# Patient Record
Sex: MALE | Race: BLACK OR AFRICAN AMERICAN | NOT HISPANIC OR LATINO | Employment: FULL TIME | URBAN - METROPOLITAN AREA
[De-identification: names, ages, dates, MRNs, and addresses within clinical notes are randomized per-mention and may not be internally consistent; named-entity substitution may affect disease eponyms.]

---

## 2019-03-12 ENCOUNTER — HOSPITAL ENCOUNTER (OUTPATIENT)
Dept: OTHER | Facility: HOSPITAL | Age: 69
Setting detail: SPECIMEN
Discharge: HOME | End: 2019-03-12
Attending: SURGERY | Admitting: SURGERY

## 2020-08-09 ENCOUNTER — APPOINTMENT (OUTPATIENT)
Dept: GENERAL RADIOLOGY | Facility: HOSPITAL | Age: 70
End: 2020-08-09

## 2020-08-09 ENCOUNTER — HOSPITAL ENCOUNTER (INPATIENT)
Facility: HOSPITAL | Age: 70
LOS: 3 days | Discharge: HOME | End: 2020-08-12
Attending: EMERGENCY MEDICINE | Admitting: INTERNAL MEDICINE

## 2020-08-09 DIAGNOSIS — J44.1 COPD WITH ACUTE EXACERBATION (HCC): ICD-10-CM

## 2020-08-09 DIAGNOSIS — J96.01 ACUTE RESPIRATORY FAILURE WITH HYPOXIA (HCC): Primary | ICD-10-CM

## 2020-08-09 DIAGNOSIS — J18.9 COMMUNITY ACQUIRED PNEUMONIA, UNSPECIFIED LATERALITY: ICD-10-CM

## 2020-08-09 DIAGNOSIS — Z20.822 SUSPECTED COVID-19 VIRUS INFECTION: ICD-10-CM

## 2020-08-09 LAB
ALBUMIN SERPL-MCNC: 3.5 G/DL (ref 3.5–5.2)
ALBUMIN/GLOB SERPL: 1 G/DL
ALP SERPL-CCNC: 60 U/L (ref 39–117)
ALT SERPL W P-5'-P-CCNC: 27 U/L (ref 1–41)
ANION GAP SERPL CALCULATED.3IONS-SCNC: 12.4 MMOL/L (ref 5–15)
AST SERPL-CCNC: 27 U/L (ref 1–40)
BASOPHILS # BLD AUTO: 0.02 10*3/MM3 (ref 0–0.2)
BASOPHILS NFR BLD AUTO: 0.3 % (ref 0–1.5)
BILIRUB SERPL-MCNC: 0.3 MG/DL (ref 0–1.2)
BUN SERPL-MCNC: 8 MG/DL (ref 8–23)
BUN/CREAT SERPL: 8.4 (ref 7–25)
CALCIUM SPEC-SCNC: 9.3 MG/DL (ref 8.6–10.5)
CHLORIDE SERPL-SCNC: 98 MMOL/L (ref 98–107)
CO2 SERPL-SCNC: 23.6 MMOL/L (ref 22–29)
CREAT SERPL-MCNC: 0.95 MG/DL (ref 0.76–1.27)
D-LACTATE SERPL-SCNC: 1.2 MMOL/L (ref 0.5–2)
DEPRECATED RDW RBC AUTO: 43.8 FL (ref 37–54)
EOSINOPHIL # BLD AUTO: 0 10*3/MM3 (ref 0–0.4)
EOSINOPHIL NFR BLD AUTO: 0 % (ref 0.3–6.2)
ERYTHROCYTE [DISTWIDTH] IN BLOOD BY AUTOMATED COUNT: 13.1 % (ref 12.3–15.4)
GFR SERPL CREATININE-BSD FRML MDRD: 95 ML/MIN/1.73
GLOBULIN UR ELPH-MCNC: 3.6 GM/DL
GLUCOSE SERPL-MCNC: 104 MG/DL (ref 65–99)
HCT VFR BLD AUTO: 42.5 % (ref 37.5–51)
HGB BLD-MCNC: 13.6 G/DL (ref 13–17.7)
IMM GRANULOCYTES # BLD AUTO: 0.04 10*3/MM3 (ref 0–0.05)
IMM GRANULOCYTES NFR BLD AUTO: 0.6 % (ref 0–0.5)
LYMPHOCYTES # BLD AUTO: 1.03 10*3/MM3 (ref 0.7–3.1)
LYMPHOCYTES NFR BLD AUTO: 14.2 % (ref 19.6–45.3)
MCH RBC QN AUTO: 29.3 PG (ref 26.6–33)
MCHC RBC AUTO-ENTMCNC: 32 G/DL (ref 31.5–35.7)
MCV RBC AUTO: 91.6 FL (ref 79–97)
MONOCYTES # BLD AUTO: 1.08 10*3/MM3 (ref 0.1–0.9)
MONOCYTES NFR BLD AUTO: 14.9 % (ref 5–12)
NEUTROPHILS NFR BLD AUTO: 5.1 10*3/MM3 (ref 1.7–7)
NEUTROPHILS NFR BLD AUTO: 70 % (ref 42.7–76)
NRBC BLD AUTO-RTO: 0 /100 WBC (ref 0–0.2)
NT-PROBNP SERPL-MCNC: 538 PG/ML (ref 0–900)
PLATELET # BLD AUTO: 251 10*3/MM3 (ref 140–450)
PMV BLD AUTO: 10.2 FL (ref 6–12)
POTASSIUM SERPL-SCNC: 3.8 MMOL/L (ref 3.5–5.2)
PROT SERPL-MCNC: 7.1 G/DL (ref 6–8.5)
RBC # BLD AUTO: 4.64 10*6/MM3 (ref 4.14–5.8)
SODIUM SERPL-SCNC: 134 MMOL/L (ref 136–145)
TROPONIN T SERPL-MCNC: <0.01 NG/ML (ref 0–0.03)
WBC # BLD AUTO: 7.27 10*3/MM3 (ref 3.4–10.8)

## 2020-08-09 PROCEDURE — 93010 ELECTROCARDIOGRAM REPORT: CPT | Performed by: INTERNAL MEDICINE

## 2020-08-09 PROCEDURE — 80053 COMPREHEN METABOLIC PANEL: CPT | Performed by: PHYSICIAN ASSISTANT

## 2020-08-09 PROCEDURE — 36415 COLL VENOUS BLD VENIPUNCTURE: CPT

## 2020-08-09 PROCEDURE — 93005 ELECTROCARDIOGRAM TRACING: CPT

## 2020-08-09 PROCEDURE — 83880 ASSAY OF NATRIURETIC PEPTIDE: CPT | Performed by: PHYSICIAN ASSISTANT

## 2020-08-09 PROCEDURE — 87040 BLOOD CULTURE FOR BACTERIA: CPT | Performed by: PHYSICIAN ASSISTANT

## 2020-08-09 PROCEDURE — 84145 PROCALCITONIN (PCT): CPT | Performed by: HOSPITALIST

## 2020-08-09 PROCEDURE — 99284 EMERGENCY DEPT VISIT MOD MDM: CPT

## 2020-08-09 PROCEDURE — 83605 ASSAY OF LACTIC ACID: CPT | Performed by: PHYSICIAN ASSISTANT

## 2020-08-09 PROCEDURE — 71045 X-RAY EXAM CHEST 1 VIEW: CPT

## 2020-08-09 PROCEDURE — 84484 ASSAY OF TROPONIN QUANT: CPT | Performed by: PHYSICIAN ASSISTANT

## 2020-08-09 PROCEDURE — 25010000002 CEFTRIAXONE PER 250 MG: Performed by: PHYSICIAN ASSISTANT

## 2020-08-09 PROCEDURE — 93005 ELECTROCARDIOGRAM TRACING: CPT | Performed by: EMERGENCY MEDICINE

## 2020-08-09 PROCEDURE — 0202U NFCT DS 22 TRGT SARS-COV-2: CPT | Performed by: PHYSICIAN ASSISTANT

## 2020-08-09 PROCEDURE — 85025 COMPLETE CBC W/AUTO DIFF WBC: CPT | Performed by: PHYSICIAN ASSISTANT

## 2020-08-09 RX ORDER — SODIUM CHLORIDE 0.9 % (FLUSH) 0.9 %
10 SYRINGE (ML) INJECTION AS NEEDED
Status: DISCONTINUED | OUTPATIENT
Start: 2020-08-09 | End: 2020-08-12 | Stop reason: HOSPADM

## 2020-08-09 RX ORDER — NITROGLYCERIN 0.4 MG/1
0.4 TABLET SUBLINGUAL
Status: DISCONTINUED | OUTPATIENT
Start: 2020-08-09 | End: 2020-08-12 | Stop reason: HOSPADM

## 2020-08-09 RX ORDER — SODIUM CHLORIDE, SODIUM LACTATE, POTASSIUM CHLORIDE, CALCIUM CHLORIDE 600; 310; 30; 20 MG/100ML; MG/100ML; MG/100ML; MG/100ML
50 INJECTION, SOLUTION INTRAVENOUS CONTINUOUS
Status: DISCONTINUED | OUTPATIENT
Start: 2020-08-09 | End: 2020-08-12 | Stop reason: HOSPADM

## 2020-08-09 RX ORDER — CEFTRIAXONE SODIUM 1 G/50ML
1 INJECTION, SOLUTION INTRAVENOUS EVERY 24 HOURS
Status: DISCONTINUED | OUTPATIENT
Start: 2020-08-10 | End: 2020-08-12 | Stop reason: HOSPADM

## 2020-08-09 RX ORDER — ONDANSETRON 4 MG/1
4 TABLET, FILM COATED ORAL EVERY 6 HOURS PRN
Status: DISCONTINUED | OUTPATIENT
Start: 2020-08-09 | End: 2020-08-12 | Stop reason: HOSPADM

## 2020-08-09 RX ORDER — ONDANSETRON 2 MG/ML
4 INJECTION INTRAMUSCULAR; INTRAVENOUS EVERY 6 HOURS PRN
Status: DISCONTINUED | OUTPATIENT
Start: 2020-08-09 | End: 2020-08-12 | Stop reason: HOSPADM

## 2020-08-09 RX ORDER — SODIUM CHLORIDE 0.9 % (FLUSH) 0.9 %
10 SYRINGE (ML) INJECTION EVERY 12 HOURS SCHEDULED
Status: DISCONTINUED | OUTPATIENT
Start: 2020-08-09 | End: 2020-08-12 | Stop reason: HOSPADM

## 2020-08-09 RX ORDER — ACETAMINOPHEN 160 MG/5ML
650 SOLUTION ORAL EVERY 4 HOURS PRN
Status: DISCONTINUED | OUTPATIENT
Start: 2020-08-09 | End: 2020-08-12 | Stop reason: HOSPADM

## 2020-08-09 RX ORDER — ACETAMINOPHEN 650 MG/1
650 SUPPOSITORY RECTAL EVERY 4 HOURS PRN
Status: DISCONTINUED | OUTPATIENT
Start: 2020-08-09 | End: 2020-08-12 | Stop reason: HOSPADM

## 2020-08-09 RX ORDER — BISACODYL 10 MG
10 SUPPOSITORY, RECTAL RECTAL DAILY PRN
Status: DISCONTINUED | OUTPATIENT
Start: 2020-08-09 | End: 2020-08-12 | Stop reason: HOSPADM

## 2020-08-09 RX ORDER — BISACODYL 5 MG/1
5 TABLET, DELAYED RELEASE ORAL DAILY PRN
Status: DISCONTINUED | OUTPATIENT
Start: 2020-08-09 | End: 2020-08-12 | Stop reason: HOSPADM

## 2020-08-09 RX ORDER — ACETAMINOPHEN 325 MG/1
650 TABLET ORAL EVERY 4 HOURS PRN
Status: DISCONTINUED | OUTPATIENT
Start: 2020-08-09 | End: 2020-08-12 | Stop reason: HOSPADM

## 2020-08-09 RX ORDER — CEFTRIAXONE SODIUM 1 G/50ML
1 INJECTION, SOLUTION INTRAVENOUS ONCE
Status: COMPLETED | OUTPATIENT
Start: 2020-08-09 | End: 2020-08-10

## 2020-08-09 RX ADMIN — CEFTRIAXONE SODIUM 1 G: 1 INJECTION, SOLUTION INTRAVENOUS at 23:27

## 2020-08-10 PROBLEM — J18.9 PNEUMONIA: Status: ACTIVE | Noted: 2020-08-10

## 2020-08-10 PROBLEM — J44.9 COPD (CHRONIC OBSTRUCTIVE PULMONARY DISEASE) (HCC): Status: ACTIVE | Noted: 2020-08-10

## 2020-08-10 PROBLEM — J01.90 ACUTE SINUSITIS: Status: ACTIVE | Noted: 2020-08-10

## 2020-08-10 PROBLEM — I10 HTN (HYPERTENSION): Status: ACTIVE | Noted: 2020-08-10

## 2020-08-10 LAB
ANION GAP SERPL CALCULATED.3IONS-SCNC: 8.2 MMOL/L (ref 5–15)
B PARAPERT DNA SPEC QL NAA+PROBE: NOT DETECTED
B PERT DNA SPEC QL NAA+PROBE: NOT DETECTED
BUN SERPL-MCNC: 8 MG/DL (ref 8–23)
BUN/CREAT SERPL: 8.8 (ref 7–25)
C PNEUM DNA NPH QL NAA+NON-PROBE: NOT DETECTED
CALCIUM SPEC-SCNC: 8.9 MG/DL (ref 8.6–10.5)
CHLORIDE SERPL-SCNC: 100 MMOL/L (ref 98–107)
CO2 SERPL-SCNC: 25.8 MMOL/L (ref 22–29)
CREAT SERPL-MCNC: 0.91 MG/DL (ref 0.76–1.27)
DEPRECATED RDW RBC AUTO: 39.8 FL (ref 37–54)
ERYTHROCYTE [DISTWIDTH] IN BLOOD BY AUTOMATED COUNT: 12.6 % (ref 12.3–15.4)
FLUAV H1 2009 PAND RNA NPH QL NAA+PROBE: NOT DETECTED
FLUAV H1 HA GENE NPH QL NAA+PROBE: NOT DETECTED
FLUAV H3 RNA NPH QL NAA+PROBE: NOT DETECTED
FLUAV SUBTYP SPEC NAA+PROBE: NOT DETECTED
FLUBV RNA ISLT QL NAA+PROBE: NOT DETECTED
GFR SERPL CREATININE-BSD FRML MDRD: 100 ML/MIN/1.73
GLUCOSE SERPL-MCNC: 138 MG/DL (ref 65–99)
HADV DNA SPEC NAA+PROBE: NOT DETECTED
HCOV 229E RNA SPEC QL NAA+PROBE: NOT DETECTED
HCOV HKU1 RNA SPEC QL NAA+PROBE: NOT DETECTED
HCOV NL63 RNA SPEC QL NAA+PROBE: NOT DETECTED
HCOV OC43 RNA SPEC QL NAA+PROBE: NOT DETECTED
HCT VFR BLD AUTO: 39.9 % (ref 37.5–51)
HGB BLD-MCNC: 13.3 G/DL (ref 13–17.7)
HMPV RNA NPH QL NAA+NON-PROBE: NOT DETECTED
HPIV1 RNA SPEC QL NAA+PROBE: NOT DETECTED
HPIV2 RNA SPEC QL NAA+PROBE: NOT DETECTED
HPIV3 RNA NPH QL NAA+PROBE: NOT DETECTED
HPIV4 P GENE NPH QL NAA+PROBE: NOT DETECTED
L PNEUMO1 AG UR QL IA: NEGATIVE
M PNEUMO IGG SER IA-ACNC: NOT DETECTED
MAGNESIUM SERPL-MCNC: 2.1 MG/DL (ref 1.6–2.4)
MCH RBC QN AUTO: 29 PG (ref 26.6–33)
MCHC RBC AUTO-ENTMCNC: 33.3 G/DL (ref 31.5–35.7)
MCV RBC AUTO: 87.1 FL (ref 79–97)
MRSA DNA SPEC QL NAA+PROBE: NORMAL
PHOSPHATE SERPL-MCNC: 2.5 MG/DL (ref 2.5–4.5)
PLATELET # BLD AUTO: 244 10*3/MM3 (ref 140–450)
PMV BLD AUTO: 10.2 FL (ref 6–12)
POTASSIUM SERPL-SCNC: 3.8 MMOL/L (ref 3.5–5.2)
PROCALCITONIN SERPL-MCNC: 0.09 NG/ML (ref 0–0.25)
RBC # BLD AUTO: 4.58 10*6/MM3 (ref 4.14–5.8)
RHINOVIRUS RNA SPEC NAA+PROBE: NOT DETECTED
RSV RNA NPH QL NAA+NON-PROBE: NOT DETECTED
S PNEUM AG SPEC QL LA: NEGATIVE
SARS-COV-2 RNA PNL SPEC NAA+PROBE: NOT DETECTED
SODIUM SERPL-SCNC: 134 MMOL/L (ref 136–145)
WBC # BLD AUTO: 6.46 10*3/MM3 (ref 3.4–10.8)

## 2020-08-10 PROCEDURE — 94640 AIRWAY INHALATION TREATMENT: CPT

## 2020-08-10 PROCEDURE — 25010000002 ENOXAPARIN PER 10 MG: Performed by: HOSPITALIST

## 2020-08-10 PROCEDURE — 83735 ASSAY OF MAGNESIUM: CPT | Performed by: HOSPITALIST

## 2020-08-10 PROCEDURE — 85027 COMPLETE CBC AUTOMATED: CPT | Performed by: HOSPITALIST

## 2020-08-10 PROCEDURE — 25010000002 CEFTRIAXONE PER 250 MG: Performed by: HOSPITALIST

## 2020-08-10 PROCEDURE — 94799 UNLISTED PULMONARY SVC/PX: CPT

## 2020-08-10 PROCEDURE — 87899 AGENT NOS ASSAY W/OPTIC: CPT | Performed by: HOSPITALIST

## 2020-08-10 PROCEDURE — 25010000002 AZITHROMYCIN PER 500 MG: Performed by: PHYSICIAN ASSISTANT

## 2020-08-10 PROCEDURE — 84100 ASSAY OF PHOSPHORUS: CPT | Performed by: HOSPITALIST

## 2020-08-10 PROCEDURE — 80048 BASIC METABOLIC PNL TOTAL CA: CPT | Performed by: HOSPITALIST

## 2020-08-10 PROCEDURE — 63710000001 PREDNISONE PER 1 MG: Performed by: INTERNAL MEDICINE

## 2020-08-10 PROCEDURE — 87641 MR-STAPH DNA AMP PROBE: CPT | Performed by: HOSPITALIST

## 2020-08-10 PROCEDURE — 36415 COLL VENOUS BLD VENIPUNCTURE: CPT | Performed by: HOSPITALIST

## 2020-08-10 RX ORDER — DILTIAZEM HYDROCHLORIDE 360 MG/1
360 CAPSULE, EXTENDED RELEASE ORAL DAILY
COMMUNITY

## 2020-08-10 RX ORDER — BRIMONIDINE TARTRATE 0.15 %
1 DROPS OPHTHALMIC (EYE) 2 TIMES DAILY
Status: DISCONTINUED | OUTPATIENT
Start: 2020-08-10 | End: 2020-08-11

## 2020-08-10 RX ORDER — ECHINACEA PURPUREA EXTRACT 125 MG
2 TABLET ORAL AS NEEDED
Status: DISCONTINUED | OUTPATIENT
Start: 2020-08-10 | End: 2020-08-12 | Stop reason: HOSPADM

## 2020-08-10 RX ORDER — PSEUDOEPHEDRINE HCL 30 MG
200 TABLET ORAL DAILY
COMMUNITY
Start: 2017-04-18

## 2020-08-10 RX ORDER — IPRATROPIUM BROMIDE AND ALBUTEROL SULFATE 2.5; .5 MG/3ML; MG/3ML
3 SOLUTION RESPIRATORY (INHALATION)
Status: DISCONTINUED | OUTPATIENT
Start: 2020-08-10 | End: 2020-08-10

## 2020-08-10 RX ORDER — ALBUTEROL SULFATE 2.5 MG/3ML
2.5 SOLUTION RESPIRATORY (INHALATION) EVERY 4 HOURS PRN
Status: DISCONTINUED | OUTPATIENT
Start: 2020-08-10 | End: 2020-08-12 | Stop reason: HOSPADM

## 2020-08-10 RX ORDER — BRIMONIDINE TARTRATE 0.15 %
1 DROPS OPHTHALMIC (EYE)
COMMUNITY

## 2020-08-10 RX ORDER — ALBUTEROL SULFATE 2.5 MG/3ML
2.5 SOLUTION RESPIRATORY (INHALATION)
COMMUNITY
Start: 2019-09-28 | End: 2020-08-12 | Stop reason: HOSPADM

## 2020-08-10 RX ORDER — LOSARTAN POTASSIUM 50 MG/1
50 TABLET ORAL DAILY
COMMUNITY

## 2020-08-10 RX ORDER — PREDNISONE 20 MG/1
20 TABLET ORAL 2 TIMES DAILY WITH MEALS
Status: DISCONTINUED | OUTPATIENT
Start: 2020-08-10 | End: 2020-08-11

## 2020-08-10 RX ORDER — PREDNISOLONE ACETATE 10 MG/ML
1 SUSPENSION/ DROPS OPHTHALMIC 3 TIMES DAILY
Status: DISCONTINUED | OUTPATIENT
Start: 2020-08-10 | End: 2020-08-11

## 2020-08-10 RX ORDER — PREDNISOLONE ACETATE 10 MG/ML
1 SUSPENSION/ DROPS OPHTHALMIC 3 TIMES DAILY
COMMUNITY

## 2020-08-10 RX ORDER — DOCUSATE SODIUM 100 MG/1
200 CAPSULE, LIQUID FILLED ORAL DAILY
Status: DISCONTINUED | OUTPATIENT
Start: 2020-08-10 | End: 2020-08-12 | Stop reason: HOSPADM

## 2020-08-10 RX ORDER — ECHINACEA PURPUREA EXTRACT 125 MG
1-2 TABLET ORAL
COMMUNITY
Start: 2020-08-06 | End: 2020-09-05

## 2020-08-10 RX ORDER — BUDESONIDE AND FORMOTEROL FUMARATE DIHYDRATE 160; 4.5 UG/1; UG/1
2 AEROSOL RESPIRATORY (INHALATION)
Status: DISCONTINUED | OUTPATIENT
Start: 2020-08-10 | End: 2020-08-12 | Stop reason: HOSPADM

## 2020-08-10 RX ORDER — IPRATROPIUM BROMIDE AND ALBUTEROL SULFATE 2.5; .5 MG/3ML; MG/3ML
3 SOLUTION RESPIRATORY (INHALATION) EVERY 4 HOURS PRN
Status: DISCONTINUED | OUTPATIENT
Start: 2020-08-10 | End: 2020-08-12 | Stop reason: HOSPADM

## 2020-08-10 RX ORDER — AMOXICILLIN 500 MG/1
500 TABLET, FILM COATED ORAL
COMMUNITY
Start: 2020-08-06 | End: 2020-08-12 | Stop reason: HOSPADM

## 2020-08-10 RX ORDER — ALBUTEROL SULFATE 90 UG/1
2 AEROSOL, METERED RESPIRATORY (INHALATION)
COMMUNITY
Start: 2020-07-29

## 2020-08-10 RX ORDER — LOSARTAN POTASSIUM 50 MG/1
50 TABLET ORAL DAILY
Status: DISCONTINUED | OUTPATIENT
Start: 2020-08-10 | End: 2020-08-12 | Stop reason: HOSPADM

## 2020-08-10 RX ORDER — DILTIAZEM HYDROCHLORIDE 180 MG/1
360 CAPSULE, COATED, EXTENDED RELEASE ORAL
Status: DISCONTINUED | OUTPATIENT
Start: 2020-08-10 | End: 2020-08-12 | Stop reason: HOSPADM

## 2020-08-10 RX ADMIN — AZITHROMYCIN MONOHYDRATE 500 MG: 500 INJECTION, POWDER, LYOPHILIZED, FOR SOLUTION INTRAVENOUS at 00:34

## 2020-08-10 RX ADMIN — SODIUM CHLORIDE, POTASSIUM CHLORIDE, SODIUM LACTATE AND CALCIUM CHLORIDE 50 ML/HR: 600; 310; 30; 20 INJECTION, SOLUTION INTRAVENOUS at 22:15

## 2020-08-10 RX ADMIN — SODIUM CHLORIDE, POTASSIUM CHLORIDE, SODIUM LACTATE AND CALCIUM CHLORIDE 50 ML/HR: 600; 310; 30; 20 INJECTION, SOLUTION INTRAVENOUS at 03:09

## 2020-08-10 RX ADMIN — ENOXAPARIN SODIUM 40 MG: 40 INJECTION SUBCUTANEOUS at 08:23

## 2020-08-10 RX ADMIN — LOSARTAN POTASSIUM 50 MG: 50 TABLET, FILM COATED ORAL at 15:42

## 2020-08-10 RX ADMIN — IPRATROPIUM BROMIDE AND ALBUTEROL SULFATE 3 ML: 2.5; .5 SOLUTION RESPIRATORY (INHALATION) at 07:22

## 2020-08-10 RX ADMIN — DILTIAZEM HYDROCHLORIDE 360 MG: 180 CAPSULE, COATED, EXTENDED RELEASE ORAL at 15:42

## 2020-08-10 RX ADMIN — SODIUM CHLORIDE, PRESERVATIVE FREE 10 ML: 5 INJECTION INTRAVENOUS at 08:23

## 2020-08-10 RX ADMIN — DOCUSATE SODIUM 200 MG: 100 CAPSULE, LIQUID FILLED ORAL at 15:42

## 2020-08-10 RX ADMIN — PREDNISONE 20 MG: 20 TABLET ORAL at 11:29

## 2020-08-10 RX ADMIN — PREDNISOLONE ACETATE 1 DROP: 10 SUSPENSION/ DROPS OPHTHALMIC at 20:32

## 2020-08-10 RX ADMIN — BRIMONIDINE TARTRATE 1 DROP: 1.5 SOLUTION OPHTHALMIC at 20:32

## 2020-08-10 RX ADMIN — PREDNISOLONE ACETATE 1 DROP: 10 SUSPENSION/ DROPS OPHTHALMIC at 15:42

## 2020-08-10 RX ADMIN — PREDNISONE 20 MG: 20 TABLET ORAL at 17:20

## 2020-08-10 RX ADMIN — BUDESONIDE AND FORMOTEROL FUMARATE DIHYDRATE 2 PUFF: 160; 4.5 AEROSOL RESPIRATORY (INHALATION) at 21:03

## 2020-08-10 RX ADMIN — CEFTRIAXONE SODIUM 1 G: 1 INJECTION, SOLUTION INTRAVENOUS at 22:11

## 2020-08-10 RX ADMIN — SODIUM CHLORIDE, PRESERVATIVE FREE 10 ML: 5 INJECTION INTRAVENOUS at 20:32

## 2020-08-10 RX ADMIN — ACETAMINOPHEN 650 MG: 325 TABLET, FILM COATED ORAL at 14:05

## 2020-08-10 NOTE — ED NOTES
I am wearing mask, goggles, and gloves. When designated I am also wearing apron and a face shield. The patient is wearing a surgical mask.      Bob Glynn RN  08/09/20 2012

## 2020-08-10 NOTE — ED TRIAGE NOTES
"Pt ambulatory to triage via private vehicle. Pt says he was sent from urgent care for \"having trouble breathing\", low oxygen saturations and fever of 101.2 at home. Pt afebrile here. Pt hx COPD. Pt respirations even and unlabored at this time. Pt oxygen saturations on RA 90%.     Pt given mask in triage, staff in mask.   "

## 2020-08-10 NOTE — ED PROVIDER NOTES
EMERGENCY DEPARTMENT ENCOUNTER    Room Number:  N524/1  Date seen:  8/10/2020  Time seen: 9:50 PM  PCP: Provider, No Known  Historian: Patient    HPI:  Chief complaint: Shortness of breath  A complete HPI/ROS/PMH/PSH/SH/FH are unobtainable due to: None  Context:Khris Shepard is a 70 y.o. male, hx of COPD, who presents to the ED with c/o chronic shortness of breath due to his COPD but started to get worse 4 days ago.  He went to urgent care 2 days ago and diagnosed with a sinus infection and was discharged.  Now here in the ER due to continuous shortness of breath and fever, highest temperature was 101 and took Tylenol with relief around 2 PM today.    At baseline, patient does not use oxygen at home.    Patient was placed in face mask in first look. Patient was wearing facemask when I entered the room and throughout our encounter. I wore full protective equipment throughout this patient encounter including a face mask, and gloves. Hand hygiene was performed before donning protective equipment and after removal when leaving the room.      MEDICAL RECORD REVIEW  Patient was seen at [facility] ER September 2019 and was diagnosed with acute respiratory failure with hypoxia.    ALLERGIES  Patient has no known allergies.    PAST MEDICAL HISTORY  Active Ambulatory Problems     Diagnosis Date Noted   • No Active Ambulatory Problems     Resolved Ambulatory Problems     Diagnosis Date Noted   • No Resolved Ambulatory Problems     Past Medical History:   Diagnosis Date   • Arthritis    • COPD (chronic obstructive pulmonary disease) (CMS/HCC)    • Hypertension        PAST SURGICAL HISTORY  Past Surgical History:   Procedure Laterality Date   • COLONOSCOPY         FAMILY HISTORY  History reviewed. No pertinent family history.    SOCIAL HISTORY  Social History     Socioeconomic History   • Marital status:      Spouse name: Not on file   • Number of children: Not on file   • Years of education: Not on file   • Highest  education level: Not on file   Tobacco Use   • Smoking status: Former Smoker     Last attempt to quit: 8/10/1998     Years since quittin.0   • Smokeless tobacco: Never Used   Substance and Sexual Activity   • Alcohol use: Yes   • Drug use: Never   • Sexual activity: Defer       REVIEW OF SYSTEMS  Review of Systems    All systems reviewed and negative except for those discussed in HPI.     PHYSICAL EXAM    ED Triage Vitals   Temp Heart Rate Resp BP SpO2   20   98 °F (36.7 °C) 98 18 (!) 169/110 90 %      Temp src Heart Rate Source Patient Position BP Location FiO2 (%)   -- 20 --    Monitor Lying Right arm      Physical Exam    I have reviewed the triage vital signs and nursing notes.      GENERAL: not distressed  HENT: nares patent  EYES: no scleral icterus  NECK: no ROM limitations  CV: regular rhythm, regular rate  RESPIRATORY: normal effort; occasional rhonchi to right lower lobe  MUSCULOSKELETAL: no deformity  NEURO: alert, moves all extremities, follows commands  SKIN: warm, dry    LAB RESULTS  Recent Results (from the past 24 hour(s))   Comprehensive Metabolic Panel    Collection Time: 20  9:26 PM   Result Value Ref Range    Glucose 104 (H) 65 - 99 mg/dL    BUN 8 8 - 23 mg/dL    Creatinine 0.95 0.76 - 1.27 mg/dL    Sodium 134 (L) 136 - 145 mmol/L    Potassium 3.8 3.5 - 5.2 mmol/L    Chloride 98 98 - 107 mmol/L    CO2 23.6 22.0 - 29.0 mmol/L    Calcium 9.3 8.6 - 10.5 mg/dL    Total Protein 7.1 6.0 - 8.5 g/dL    Albumin 3.50 3.50 - 5.20 g/dL    ALT (SGPT) 27 1 - 41 U/L    AST (SGOT) 27 1 - 40 U/L    Alkaline Phosphatase 60 39 - 117 U/L    Total Bilirubin 0.3 0.0 - 1.2 mg/dL    eGFR  African Amer 95 >60 mL/min/1.73    Globulin 3.6 gm/dL    A/G Ratio 1.0 g/dL    BUN/Creatinine Ratio 8.4 7.0 - 25.0    Anion Gap 12.4 5.0 - 15.0 mmol/L   CBC Auto Differential    Collection Time: 20  9:26 PM    Result Value Ref Range    WBC 7.27 3.40 - 10.80 10*3/mm3    RBC 4.64 4.14 - 5.80 10*6/mm3    Hemoglobin 13.6 13.0 - 17.7 g/dL    Hematocrit 42.5 37.5 - 51.0 %    MCV 91.6 79.0 - 97.0 fL    MCH 29.3 26.6 - 33.0 pg    MCHC 32.0 31.5 - 35.7 g/dL    RDW 13.1 12.3 - 15.4 %    RDW-SD 43.8 37.0 - 54.0 fl    MPV 10.2 6.0 - 12.0 fL    Platelets 251 140 - 450 10*3/mm3    Neutrophil % 70.0 42.7 - 76.0 %    Lymphocyte % 14.2 (L) 19.6 - 45.3 %    Monocyte % 14.9 (H) 5.0 - 12.0 %    Eosinophil % 0.0 (L) 0.3 - 6.2 %    Basophil % 0.3 0.0 - 1.5 %    Immature Grans % 0.6 (H) 0.0 - 0.5 %    Neutrophils, Absolute 5.10 1.70 - 7.00 10*3/mm3    Lymphocytes, Absolute 1.03 0.70 - 3.10 10*3/mm3    Monocytes, Absolute 1.08 (H) 0.10 - 0.90 10*3/mm3    Eosinophils, Absolute 0.00 0.00 - 0.40 10*3/mm3    Basophils, Absolute 0.02 0.00 - 0.20 10*3/mm3    Immature Grans, Absolute 0.04 0.00 - 0.05 10*3/mm3    nRBC 0.0 0.0 - 0.2 /100 WBC   Troponin    Collection Time: 08/09/20  9:26 PM   Result Value Ref Range    Troponin T <0.010 0.000 - 0.030 ng/mL   BNP    Collection Time: 08/09/20  9:26 PM   Result Value Ref Range    proBNP 538.0 0.0 - 900.0 pg/mL   Procalcitonin    Collection Time: 08/09/20  9:26 PM   Result Value Ref Range    Procalcitonin 0.09 0.00 - 0.25 ng/mL   Lactic Acid, Plasma    Collection Time: 08/09/20 10:30 PM   Result Value Ref Range    Lactate 1.2 0.5 - 2.0 mmol/L   Respiratory Panel PCR w/COVID-19(SARS-CoV-2) STEFFEN/KESHAV/JOANNA In-House, NP Swab in [facility], 3-4 Hr TAT - Swab, Nasopharynx    Collection Time: 08/09/20 11:28 PM   Result Value Ref Range    ADENOVIRUS, PCR Not Detected Not Detected    Coronavirus 229E Not Detected Not Detected    Coronavirus HKU1 Not Detected Not Detected    Coronavirus NL63 Not Detected Not Detected    Coronavirus OC43 Not Detected Not Detected    COVID19 Not Detected Not Detected - Ref. Range    Human Metapneumovirus Not Detected Not Detected    Human Rhinovirus/Enterovirus Not Detected Not Detected     Influenza A PCR Not Detected Not Detected    Influenza A H1 Not Detected Not Detected    Influenza A H1 2009 PCR Not Detected Not Detected    Influenza A H3 Not Detected Not Detected    Influenza B PCR Not Detected Not Detected    Parainfluenza Virus 1 Not Detected Not Detected    Parainfluenza Virus 2 Not Detected Not Detected    Parainfluenza Virus 3 Not Detected Not Detected    Parainfluenza Virus 4 Not Detected Not Detected    RSV, PCR Not Detected Not Detected    Bordetella pertussis pcr Not Detected Not Detected    Bordetella parapertussis PCR Not Detected Not Detected    Chlamydophila pneumoniae PCR Not Detected Not Detected    Mycoplasma pneumo by PCR Not Detected Not Detected   Legionella Antigen, Urine - Urine, Urine, Clean Catch    Collection Time: 08/10/20  3:10 AM   Result Value Ref Range    LEGIONELLA ANTIGEN, URINE Negative Negative   S. Pneumo Ag Urine or CSF - Urine, Urine, Clean Catch    Collection Time: 08/10/20  3:10 AM   Result Value Ref Range    Strep Pneumo Ag Negative Negative         RADIOLOGY RESULTS  XR Chest 1 View   Final Result   Bibasilar consolidation may represent pneumonia. There are advanced   background emphysematous changes, and short-term follow-up to document   resolution is recommended.       This report was finalized on 8/9/2020 10:07 PM by Dr. Rocio Abdullahi M.D.                PROGRESS, DATA ANALYSIS, CONSULTS AND MEDICAL DECISION MAKING  All labs have been independently reviewed by me.  All radiology studies have been reviewed by me and discussed with radiologist dictating the report. Discussion below represents my analysis of pertinent findings related to patient's condition, differential diagnosis, treatment plan and final disposition.     ED Course as of Aug 10 0410   Sun Aug 09, 2020   2223 Removed patient's oxygen in the ER, while laying in his bed his oxygen level dropped to 88% with a good pleth.    [SS]   2342 I discussed with Dr. Lopez, hospitalist.   "Agrees admit patient for acute respiratory failure with hypoxia and suspected COVID-19.    [SS]      ED Course User Index  [SS] Leena Aleman PA-C       The differential diagnosis include but are not limited to pneumonia, COPD exacerbation, hypoxia, COVID-19.         Reviewed pt's history and workup with Dr. Muller.  After a bedside evaluation, Dr. Muller agrees with the plan of care.    .    (FOR ADMIT) Based on the patient's lab findings and presenting symptoms, the doctor and I feel it is appropriate to admit the patient for further management, evaluation, and treatment.  I have discussed this with the admitting team.  I have also discussed this with the patient/family.  They are in agreement with admission.          Disposition vitals:  /97 (BP Location: Right arm, Patient Position: Lying)   Pulse 83   Temp (!) 100.9 °F (38.3 °C) (Oral)   Resp 20   Ht 172.7 cm (68\")   Wt 70.6 kg (155 lb 10.3 oz)   SpO2 95%   BMI 23.67 kg/m²       DIAGNOSIS  Final diagnoses:   Community acquired pneumonia, unspecified laterality   Acute respiratory failure with hypoxia (CMS/HCC)   COPD with acute exacerbation (CMS/HCC)   Suspected COVID-19 virus infection       FOLLOW UP   No follow-up provider specified.       Leena Aleman PA-C  08/10/20 0410    "

## 2020-08-10 NOTE — H&P
Patient Name:  Khris Shepard  YOB: 1950  MRN:  [id number]  Admit Date:  8/9/2020  Patient Care Team:  Provider, No Known as PCP - General      Chief Complaint   Patient presents with   • Shortness of Breath       Subjective     Mr. Shepard is a 70 y.o. male with a history of COPD, HTN that presents to [facility] complaining of shortness of breath. Patient reports the shortness of breath began on Wednesday and has worsened since that time. He states that he was seen at [place] on Wednesday, given amoxicillin and sent home, but states he hasn't improved. He reports the shortness of breath is worse with exertion and relieved some with rest, he denies cough. It was also noted per ED that patient's oxygen saturation was 87% on room air on arrival. He denies chest pain, abdominal pain, changes in bowel or bladder habits, edema. Labs done tonight are fairly unremarkable but CXR shows bibasilar consolidation. Blood cultures were drawn and he was given Rocephin and Azithromycin in ED.      History of Present Illness    History reviewed. No pertinent past medical history.  History reviewed. No pertinent surgical history.  History reviewed. No pertinent family history.  Social History     Tobacco Use   • Smoking status: Not on file   Substance Use Topics   • Alcohol use: Not on file   • Drug use: Not on file       (Not in a hospital admission)  Allergies:  No Known Allergies    Review of Systems   Constitutional: Positive for appetite change, chills and fever.   HENT: Negative.  Negative for congestion, rhinorrhea and sore throat.    Eyes: Negative.  Negative for visual disturbance.   Respiratory: Positive for shortness of breath. Negative for cough.    Cardiovascular: Negative.  Negative for chest pain.   Gastrointestinal: Negative.  Negative for abdominal pain, nausea and vomiting.   Endocrine: Negative.    Genitourinary: Negative.  Negative for dysuria, frequency and urgency.    Musculoskeletal: Negative.  Negative for arthralgias and myalgias.   Skin: Negative.  Negative for color change, pallor and rash.   Allergic/Immunologic: Negative.    Neurological: Negative.  Negative for dizziness, weakness and light-headedness.   Hematological: Negative.    Psychiatric/Behavioral: Negative.  Negative for agitation, behavioral problems and confusion.        Objective    Vital Signs  Temp:  [98 °F (36.7 °C)] 98 °F (36.7 °C)  Heart Rate:  [78-98] 86  Resp:  [16-18] 16  BP: (159-177)/(105-118) 177/111  SpO2:  [90 %-99 %] 97 %  on  Flow (L/min):  [2] 2;   Device (Oxygen Therapy): nasal cannula  Body mass index is 24.33 kg/m².    Physical Exam   Constitutional: He is oriented to person, place, and time. He appears well-developed and well-nourished. No distress.   HENT:   Head: Normocephalic and atraumatic.   Eyes: EOM are normal.   Neck: Normal range of motion. Neck supple.   Cardiovascular: Normal rate, regular rhythm and intact distal pulses.   Pulmonary/Chest: Effort normal. No respiratory distress. He has rales in the right lower field.   Abdominal: Soft. Bowel sounds are normal.   Musculoskeletal: Normal range of motion. He exhibits no edema.   Neurological: He is alert and oriented to person, place, and time.   Skin: Skin is warm and dry. He is not diaphoretic. No erythema.   Psychiatric: He has a normal mood and affect. His behavior is normal. Judgment and thought content normal.   Nursing note and vitals reviewed.      Results Review:   I reviewed the patient's new clinical results including all labs and xrays.    Lab Results (last 24 hours)     Procedure Component Value Units Date/Time    CBC & Differential [[id number]] Collected:  08/09/20 2126    Specimen:  Blood Updated:  08/09/20 2140    Narrative:       The following orders were created for panel order CBC & Differential.  Procedure                               Abnormality         Status                     ---------                                -----------         ------                     CBC Auto Differential[[id number]]        Abnormal            Final result                 Please view results for these tests on the individual orders.    Comprehensive Metabolic Panel [[id number]]  (Abnormal) Collected:  08/09/20 2126    Specimen:  Blood Updated:  08/09/20 2159     Glucose 104 mg/dL      BUN 8 mg/dL      Creatinine 0.95 mg/dL      Sodium 134 mmol/L      Potassium 3.8 mmol/L      Chloride 98 mmol/L      CO2 23.6 mmol/L      Calcium 9.3 mg/dL      Total Protein 7.1 g/dL      Albumin 3.50 g/dL      ALT (SGPT) 27 U/L      AST (SGOT) 27 U/L      Alkaline Phosphatase 60 U/L      Total Bilirubin 0.3 mg/dL      eGFR  African Amer 95 mL/min/1.73      Globulin 3.6 gm/dL      A/G Ratio 1.0 g/dL      BUN/Creatinine Ratio 8.4     Anion Gap 12.4 mmol/L     Narrative:       GFR Normal >60  Chronic Kidney Disease <60  Kidney Failure <15      CBC Auto Differential [[id number]]  (Abnormal) Collected:  08/09/20 2126    Specimen:  Blood Updated:  08/09/20 2140     WBC 7.27 10*3/mm3      RBC 4.64 10*6/mm3      Hemoglobin 13.6 g/dL      Hematocrit 42.5 %      MCV 91.6 fL      MCH 29.3 pg      MCHC 32.0 g/dL      RDW 13.1 %      RDW-SD 43.8 fl      MPV 10.2 fL      Platelets 251 10*3/mm3      Neutrophil % 70.0 %      Lymphocyte % 14.2 %      Monocyte % 14.9 %      Eosinophil % 0.0 %      Basophil % 0.3 %      Immature Grans % 0.6 %      Neutrophils, Absolute 5.10 10*3/mm3      Lymphocytes, Absolute 1.03 10*3/mm3      Monocytes, Absolute 1.08 10*3/mm3      Eosinophils, Absolute 0.00 10*3/mm3      Basophils, Absolute 0.02 10*3/mm3      Immature Grans, Absolute 0.04 10*3/mm3      nRBC 0.0 /100 WBC     Troponin [[id number]]  (Normal) Collected:  08/09/20 2126    Specimen:  Blood Updated:  08/09/20 2221     Troponin T <0.010 ng/mL     Narrative:       Troponin T Reference Range:  <= 0.03 ng/mL-   Negative for AMI  >0.03 ng/mL-     Abnormal for myocardial necrosis.   Clinicians would have to utilize clinical acumen, EKG, Troponin and serial changes to determine if it is an Acute Myocardial Infarction or myocardial injury due to an underlying chronic condition.       Results may be falsely decreased if patient taking Biotin.      BNP [[id number]]  (Normal) Collected:  08/09/20 2126    Specimen:  Blood Updated:  08/09/20 2221     proBNP 538.0 pg/mL     Narrative:       Among patients with dyspnea, NT-proBNP is highly sensitive for the detection of acute congestive heart failure. In addition NT-proBNP of <300 pg/ml effectively rules out acute congestive heart failure with 99% negative predictive value.    Results may be falsely decreased if patient taking Biotin.      Procalcitonin [[id number]] Collected:  08/09/20 2126    Specimen:  Blood Updated:  08/09/20 2356    Lactic Acid, Plasma [[id number]]  (Normal) Collected:  08/09/20 2230    Specimen:  Blood Updated:  08/09/20 2316     Lactate 1.2 mmol/L     Blood Culture - Blood, Arm, Right [[id number]] Collected:  08/09/20 2230    Specimen:  Blood from Arm, Right Updated:  08/09/20 2245    Blood Culture - Blood, Arm, Right [[id number]] Collected:  08/09/20 2230    Specimen:  Blood from Arm, Right Updated:  08/09/20 2245    Respiratory Panel PCR w/COVID-19(SARS-CoV-2) STEFFEN/KESHAV/JOANNA In-House, NP Swab in [facility], 3-4 Hr TAT - Swab, Nasopharynx [[id number]]  (Normal) Collected:  08/09/20 2328    Specimen:  Swab from Nasopharynx Updated:  08/10/20 0029     ADENOVIRUS, PCR Not Detected     Coronavirus 229E Not Detected     Coronavirus HKU1 Not Detected     Coronavirus NL63 Not Detected     Coronavirus OC43 Not Detected     COVID19 Not Detected     Human Metapneumovirus Not Detected     Human Rhinovirus/Enterovirus Not Detected     Influenza A PCR Not Detected     Influenza A H1 Not Detected     Influenza A H1 2009 PCR Not Detected     Influenza A H3 Not Detected     Influenza B PCR Not Detected     Parainfluenza Virus 1 Not Detected      Parainfluenza Virus 2 Not Detected     Parainfluenza Virus 3 Not Detected     Parainfluenza Virus 4 Not Detected     RSV, PCR Not Detected     Bordetella pertussis pcr Not Detected     Bordetella parapertussis PCR Not Detected     Chlamydophila pneumoniae PCR Not Detected     Mycoplasma pneumo by PCR Not Detected    Narrative:       Fact sheet for providers: https://docs.[employer]/wp-content/uploads/XZD9153-7993-VJ4.1-EUA-Provider-Fact-Sheet-3.pdf    Fact sheet for patients: https://docs.[employer]/wp-content/uploads/XJB4927-1808-BX6.1-EUA-Patient-Fact-Sheet-1.pdf          XR Chest 1 View   Final Result   Bibasilar consolidation may represent pneumonia. There are advanced   background emphysematous changes, and short-term follow-up to document   resolution is recommended.       This report was finalized on 8/9/2020 10:07 PM by Dr. Rocio Abdullahi M.D.            Assessment/Plan      Active Hospital Problems    Diagnosis  POA   • **Pneumonia [J18.9]  Yes   • COPD (chronic obstructive pulmonary disease) (CMS/HCC) [J44.9]  Yes   • HTN (hypertension) [I10]  Yes   • Acute respiratory failure with hypoxia (CMS/HCC) [J96.01]  Yes      Resolved Hospital Problems   No resolved problems to display.     Pneumonia/acute respiratory failure with hypoxia  -CXR shows bibasilar consolidation  -blood cultures pending  -given rocephin and azithromycin in ED, will continue  -MRSA screen pending  -sputum culture and urine antigens pending  -duo-nebs scheduled and PRN    COPD/HTN  -stable, will continue home meds once verified by RN    VTE Ppx  -Lovenox    CODE status  -full    I discussed the patients findings and my recommendations with patient.    ANGE Trejo  [facility]  08/10/20  12:23 AM

## 2020-08-10 NOTE — ED PROVIDER NOTES
Pt presents to the ED c/o  seen shortness of breath today.  Patient states that he has had a cough that is been nonproductive along with shortness of breath for 4 days.  He also had sinus congestion.  He saw his primary doctor who placed him on antibiotics.  States he did not get better he developed a fever with a temperature of 101 over the past 2 days.  They he developed increasing shortness of breath so he went to an urgent care.  The urgent care sent him here today for further evaluation and treatment.     On exam,   His heart is regular rate and rhythm without murmurs.    His lungs reveal occasional rhonchi in the right base but otherwise clear.  His abdomen is normoactive bowel sounds, soft, nontender nondistended.  His extremities reveal no edema or calf tenderness.    Plan: We have plan of checking patient for SARS-CoV-2 and to rule out pneumonia or heart failure.      Patient was placed in face mask in first look. Patient was wearing facemask when I entered the room and throughout our encounter. I wore full protective equipment throughout this patient encounter including a face mask, eye shield, gown and gloves. Hand hygiene was performed before donning protective equipment and after removal when leaving the room.       Attestation:  The L[facility] and I have discussed this patient's history, physical exam, and treatment plan.  I have reviewed the documentation and personally had a face to face interaction with the patient. I affirm the documentation and agree with the treatment and plan.  The attached note describes my personal findings.            Abdi Muller MD  08/09/20 [id number]

## 2020-08-10 NOTE — PROGRESS NOTES
Discharge Planning Assessment  [facility]     Patient Name: Khris Shepard  MRN: [id number]  Today's Date: 8/10/2020    Admit Date: 8/9/2020    Discharge Needs Assessment     Row Name 08/10/20 0295       Living Environment    Lives With  spouse    Name(s) of Who Lives With Patient  wife, Yanni Shepard, [phone]    Current Living Arrangements  home/apartment/condo    Primary Care Provided by  self    Provides Primary Care For  no one    Family Caregiver if Needed  spouse    Quality of Family Relationships  involved;supportive;helpful    Able to Return to Prior Arrangements  yes       Resource/Environmental Concerns    Resource/Environmental Concerns  none       Transition Planning    Patient/Family Anticipates Transition to  home with family    Patient/Family Anticipated Services at Transition  none    Transportation Anticipated  family or friend will provide       Discharge Needs Assessment    Concerns to be Addressed  no discharge needs identified;denies needs/concerns at this time    Equipment Currently Used at Home  nebulizer    Discharge Coordination/Progress  Home        Discharge Plan     Row Name 08/10/20 9397       Plan    Plan  Home with wife, follow for needs    Patient/Family in Agreement with Plan  yes    Plan Comments  IMM letter checked. CCP spoke with pt via [facility] room phone due to isolation precautions. Facesheet verified and CCP role explained. Pt resides with his wife in a single level home, uses no DME aside from a nebulizer, and has no h/o home health or sub-acute rehab. Pt uses [facility] pharmacy on [address] and declines Meds to Beds enrollment. Pt uncertain of needs pending treatment course. Pt currently requiring supplemental O2. If O2 needed at d/c, pt identifies has no supply company preference. CCP to follow to assist should needs arise. Kelly Valle LCSW        Destination      Coordination has not been started for this encounter.      Durable Medical Equipment      Coordination has not  been started for this encounter.      Dialysis/Infusion      Coordination has not been started for this encounter.      Home Medical Care      Coordination has not been started for this encounter.      Therapy      Coordination has not been started for this encounter.      Community Resources      Coordination has not been started for this encounter.          Demographic Summary     Row Name 08/10/20 1324       General Information    Admission Type  inpatient    Arrived From  home    Required Notices Provided  Important Message from Medicare    Referral Source  admission list    Reason for Consult  discharge planning    Preferred Language  English        Functional Status     Row Name 08/10/20 1324       Functional Status    Usual Activity Tolerance  good    Current Activity Tolerance  good       Functional Status, IADL    Medications  independent    Meal Preparation  independent    Housekeeping  independent    Laundry  independent    Shopping  independent       Mental Status Summary    Recent Changes in Mental Status/Cognitive Functioning  no changes        Psychosocial    No documentation.       Abuse/Neglect    No documentation.       Legal    No documentation.       Substance Abuse    No documentation.       Patient Forms    No documentation.           Tiffany Valle LCSW

## 2020-08-10 NOTE — ED NOTES
Pt placed in enhanced droplet/contact precautions at this time r/t suspected COVID19, this nurse and all staff in full PPE with gown, goggles, mask and gloves in to see pt whom is also masked throughout encounter and all pt care areas.     Maritza Ye RN  08/09/20 8845

## 2020-08-11 LAB
ALBUMIN SERPL-MCNC: 2.9 G/DL (ref 3.5–5.2)
ALBUMIN/GLOB SERPL: 0.8 G/DL
ALP SERPL-CCNC: 53 U/L (ref 39–117)
ALT SERPL W P-5'-P-CCNC: 31 U/L (ref 1–41)
ANION GAP SERPL CALCULATED.3IONS-SCNC: 7.7 MMOL/L (ref 5–15)
AST SERPL-CCNC: 28 U/L (ref 1–40)
B PARAPERT DNA SPEC QL NAA+PROBE: NOT DETECTED
B PERT DNA SPEC QL NAA+PROBE: NOT DETECTED
BASOPHILS # BLD AUTO: 0.01 10*3/MM3 (ref 0–0.2)
BASOPHILS NFR BLD AUTO: 0.2 % (ref 0–1.5)
BILIRUB SERPL-MCNC: <0.2 MG/DL (ref 0–1.2)
BUN SERPL-MCNC: 9 MG/DL (ref 8–23)
BUN/CREAT SERPL: 11.8 (ref 7–25)
C PNEUM DNA NPH QL NAA+NON-PROBE: NOT DETECTED
CALCIUM SPEC-SCNC: 9.3 MG/DL (ref 8.6–10.5)
CHLORIDE SERPL-SCNC: 106 MMOL/L (ref 98–107)
CO2 SERPL-SCNC: 27.3 MMOL/L (ref 22–29)
CREAT SERPL-MCNC: 0.76 MG/DL (ref 0.76–1.27)
DEPRECATED RDW RBC AUTO: 41 FL (ref 37–54)
EOSINOPHIL # BLD AUTO: 0 10*3/MM3 (ref 0–0.4)
EOSINOPHIL NFR BLD AUTO: 0 % (ref 0.3–6.2)
ERYTHROCYTE [DISTWIDTH] IN BLOOD BY AUTOMATED COUNT: 12.3 % (ref 12.3–15.4)
FERRITIN SERPL-MCNC: 665 NG/ML (ref 30–400)
FLUAV H1 2009 PAND RNA NPH QL NAA+PROBE: NOT DETECTED
FLUAV H1 HA GENE NPH QL NAA+PROBE: NOT DETECTED
FLUAV H3 RNA NPH QL NAA+PROBE: NOT DETECTED
FLUAV SUBTYP SPEC NAA+PROBE: NOT DETECTED
FLUBV RNA ISLT QL NAA+PROBE: NOT DETECTED
GFR SERPL CREATININE-BSD FRML MDRD: 123 ML/MIN/1.73
GLOBULIN UR ELPH-MCNC: 3.5 GM/DL
GLUCOSE SERPL-MCNC: 117 MG/DL (ref 65–99)
HADV DNA SPEC NAA+PROBE: NOT DETECTED
HCOV 229E RNA SPEC QL NAA+PROBE: NOT DETECTED
HCOV HKU1 RNA SPEC QL NAA+PROBE: NOT DETECTED
HCOV NL63 RNA SPEC QL NAA+PROBE: NOT DETECTED
HCOV OC43 RNA SPEC QL NAA+PROBE: NOT DETECTED
HCT VFR BLD AUTO: 40.4 % (ref 37.5–51)
HGB BLD-MCNC: 13 G/DL (ref 13–17.7)
HMPV RNA NPH QL NAA+NON-PROBE: NOT DETECTED
HPIV1 RNA SPEC QL NAA+PROBE: NOT DETECTED
HPIV2 RNA SPEC QL NAA+PROBE: NOT DETECTED
HPIV3 RNA NPH QL NAA+PROBE: NOT DETECTED
HPIV4 P GENE NPH QL NAA+PROBE: NOT DETECTED
IMM GRANULOCYTES # BLD AUTO: 0.02 10*3/MM3 (ref 0–0.05)
IMM GRANULOCYTES NFR BLD AUTO: 0.3 % (ref 0–0.5)
LYMPHOCYTES # BLD AUTO: 0.76 10*3/MM3 (ref 0.7–3.1)
LYMPHOCYTES NFR BLD AUTO: 12.6 % (ref 19.6–45.3)
M PNEUMO IGG SER IA-ACNC: NOT DETECTED
MCH RBC QN AUTO: 29 PG (ref 26.6–33)
MCHC RBC AUTO-ENTMCNC: 32.2 G/DL (ref 31.5–35.7)
MCV RBC AUTO: 90 FL (ref 79–97)
MONOCYTES # BLD AUTO: 0.67 10*3/MM3 (ref 0.1–0.9)
MONOCYTES NFR BLD AUTO: 11.1 % (ref 5–12)
NEUTROPHILS NFR BLD AUTO: 4.57 10*3/MM3 (ref 1.7–7)
NEUTROPHILS NFR BLD AUTO: 75.8 % (ref 42.7–76)
NRBC BLD AUTO-RTO: 0 /100 WBC (ref 0–0.2)
PLATELET # BLD AUTO: 272 10*3/MM3 (ref 140–450)
PMV BLD AUTO: 10.2 FL (ref 6–12)
POTASSIUM SERPL-SCNC: 4.5 MMOL/L (ref 3.5–5.2)
PROT SERPL-MCNC: 6.4 G/DL (ref 6–8.5)
RBC # BLD AUTO: 4.49 10*6/MM3 (ref 4.14–5.8)
RHINOVIRUS RNA SPEC NAA+PROBE: NOT DETECTED
RSV RNA NPH QL NAA+NON-PROBE: NOT DETECTED
SARS-COV-2 RNA PNL SPEC NAA+PROBE: NOT DETECTED
SODIUM SERPL-SCNC: 141 MMOL/L (ref 136–145)
WBC # BLD AUTO: 6.03 10*3/MM3 (ref 3.4–10.8)

## 2020-08-11 PROCEDURE — 82728 ASSAY OF FERRITIN: CPT | Performed by: INTERNAL MEDICINE

## 2020-08-11 PROCEDURE — 63710000001 PREDNISONE PER 1 MG: Performed by: INTERNAL MEDICINE

## 2020-08-11 PROCEDURE — 25010000002 ENOXAPARIN PER 10 MG: Performed by: HOSPITALIST

## 2020-08-11 PROCEDURE — 85025 COMPLETE CBC W/AUTO DIFF WBC: CPT | Performed by: INTERNAL MEDICINE

## 2020-08-11 PROCEDURE — 80053 COMPREHEN METABOLIC PANEL: CPT | Performed by: INTERNAL MEDICINE

## 2020-08-11 PROCEDURE — 25010000002 CEFTRIAXONE PER 250 MG: Performed by: HOSPITALIST

## 2020-08-11 PROCEDURE — 0202U NFCT DS 22 TRGT SARS-COV-2: CPT | Performed by: INTERNAL MEDICINE

## 2020-08-11 PROCEDURE — 94799 UNLISTED PULMONARY SVC/PX: CPT

## 2020-08-11 RX ORDER — PREDNISONE 20 MG/1
20 TABLET ORAL
Status: DISCONTINUED | OUTPATIENT
Start: 2020-08-12 | End: 2020-08-12 | Stop reason: HOSPADM

## 2020-08-11 RX ORDER — PREDNISOLONE ACETATE 10 MG/ML
1 SUSPENSION/ DROPS OPHTHALMIC 3 TIMES DAILY
Status: DISCONTINUED | OUTPATIENT
Start: 2020-08-11 | End: 2020-08-12 | Stop reason: HOSPADM

## 2020-08-11 RX ORDER — BRIMONIDINE TARTRATE 0.15 %
1 DROPS OPHTHALMIC (EYE) 2 TIMES DAILY
Status: DISCONTINUED | OUTPATIENT
Start: 2020-08-11 | End: 2020-08-12 | Stop reason: HOSPADM

## 2020-08-11 RX ORDER — AZITHROMYCIN 250 MG/1
250 TABLET, FILM COATED ORAL
Status: DISCONTINUED | OUTPATIENT
Start: 2020-08-11 | End: 2020-08-12 | Stop reason: HOSPADM

## 2020-08-11 RX ADMIN — SODIUM CHLORIDE, PRESERVATIVE FREE 10 ML: 5 INJECTION INTRAVENOUS at 21:45

## 2020-08-11 RX ADMIN — DILTIAZEM HYDROCHLORIDE 360 MG: 180 CAPSULE, COATED, EXTENDED RELEASE ORAL at 08:11

## 2020-08-11 RX ADMIN — AZITHROMYCIN MONOHYDRATE 250 MG: 250 TABLET ORAL at 21:43

## 2020-08-11 RX ADMIN — BRIMONIDINE TARTRATE 1 DROP: 1.5 SOLUTION OPHTHALMIC at 08:10

## 2020-08-11 RX ADMIN — DOCUSATE SODIUM 200 MG: 100 CAPSULE, LIQUID FILLED ORAL at 08:11

## 2020-08-11 RX ADMIN — BUDESONIDE AND FORMOTEROL FUMARATE DIHYDRATE 2 PUFF: 160; 4.5 AEROSOL RESPIRATORY (INHALATION) at 20:02

## 2020-08-11 RX ADMIN — PREDNISOLONE ACETATE 1 DROP: 10 SUSPENSION/ DROPS OPHTHALMIC at 16:15

## 2020-08-11 RX ADMIN — PREDNISOLONE ACETATE 1 DROP: 10 SUSPENSION/ DROPS OPHTHALMIC at 08:10

## 2020-08-11 RX ADMIN — SODIUM CHLORIDE, PRESERVATIVE FREE 10 ML: 5 INJECTION INTRAVENOUS at 08:11

## 2020-08-11 RX ADMIN — BUDESONIDE AND FORMOTEROL FUMARATE DIHYDRATE 2 PUFF: 160; 4.5 AEROSOL RESPIRATORY (INHALATION) at 07:18

## 2020-08-11 RX ADMIN — ENOXAPARIN SODIUM 40 MG: 40 INJECTION SUBCUTANEOUS at 08:10

## 2020-08-11 RX ADMIN — PREDNISONE 20 MG: 20 TABLET ORAL at 08:11

## 2020-08-11 RX ADMIN — BRIMONIDINE TARTRATE 1 DROP: 1.5 SOLUTION OPHTHALMIC at 21:46

## 2020-08-11 RX ADMIN — CEFTRIAXONE SODIUM 1 G: 1 INJECTION, SOLUTION INTRAVENOUS at 23:04

## 2020-08-11 RX ADMIN — SODIUM CHLORIDE, POTASSIUM CHLORIDE, SODIUM LACTATE AND CALCIUM CHLORIDE 50 ML/HR: 600; 310; 30; 20 INJECTION, SOLUTION INTRAVENOUS at 18:08

## 2020-08-11 RX ADMIN — PREDNISOLONE ACETATE 1 DROP: 10 SUSPENSION/ DROPS OPHTHALMIC at 21:45

## 2020-08-11 RX ADMIN — LOSARTAN POTASSIUM 50 MG: 50 TABLET, FILM COATED ORAL at 08:11

## 2020-08-11 NOTE — PROGRESS NOTES
Name: Khris Shepard ADMIT: 2020   : 1950  PCP: Khris Powers MD    MRN: [id number] LOS: 2 days   AGE/SEX: 70 y.o. male  ROOM: [facility]   Subjective   Chief Complaint   Patient presents with   • Shortness of Breath   Dyspnea is fair  Still requiring oxygen  On IV antibiotics  H/o COPD    ROS  No f/c  No n/v  No cp/palp  +soa/cough    Objective   Vital Signs  Temp:  [97 °F (36.1 °C)-97.7 °F (36.5 °C)] 97.4 °F (36.3 °C)  Heart Rate:  [46-73] 72  Resp:  [14-16] 16  BP: (144-161)/(90-98) 161/98  SpO2:  [89 %-97 %] 89 %  on  Flow (L/min):  [1-3] 1;   Device (Oxygen Therapy): nasal cannula  Body mass index is 23.67 kg/m².    Physical Exam   Constitutional: He is oriented to person, place, and time. He appears well-developed and well-nourished. No distress.   HENT:   Head: Normocephalic and atraumatic.   Mouth/Throat: Oropharynx is clear and moist.   Eyes: Conjunctivae are normal. No scleral icterus.   Neck: Normal range of motion. Neck supple.   Cardiovascular: Normal rate, regular rhythm and normal heart sounds.   No murmur heard.  Pulmonary/Chest: Effort normal and breath sounds normal. No respiratory distress.   Abdominal: Soft. Bowel sounds are normal. There is no tenderness.   Musculoskeletal: He exhibits no edema or deformity.   Neurological: He is alert and oriented to person, place, and time.   Skin: Skin is warm and dry. He is not diaphoretic.   Psychiatric: He has a normal mood and affect. His behavior is normal. Thought content normal.   Nursing note and vitals reviewed.      Results Review:       I reviewed the patient's new clinical results.  Results from last 7 days   Lab Units 20  0613 08/10/20  0435 206   WBC 10*3/mm3 6.03 6.46 7.27   HEMOGLOBIN g/dL 13.0 13.3 13.6   PLATELETS 10*3/mm3 272 244 251     Results from last 7 days   Lab Units 20  0613 08/10/20  0435 20  0136   SODIUM mmol/L 141 134* 134*   POTASSIUM mmol/L 4.5 3.8 3.8   CHLORIDE mmol/L 106 100 98    CO2 mmol/L 27.3 25.8 23.6   BUN mg/dL 9 8 8   CREATININE mg/dL 0.76 0.91 0.95   GLUCOSE mg/dL 117* 138* 104*   Estimated Creatinine Clearance: 85.8 mL/min (by C-G formula based on SCr of 0.76 mg/dL).  Results from last 7 days   Lab Units 08/11/20  0613 08/09/20  2126   ALBUMIN g/dL 2.90* 3.50   BILIRUBIN mg/dL <0.2 0.3   ALK PHOS U/L 53 60   AST (SGOT) U/L 28 27   ALT (SGPT) U/L 31 27     Results from last 7 days   Lab Units 08/11/20  0613 08/10/20  0435 08/09/20  2126   CALCIUM mg/dL 9.3 8.9 9.3   ALBUMIN g/dL 2.90*  --  3.50   MAGNESIUM mg/dL  --  2.1  --    PHOSPHORUS mg/dL  --  2.5  --      Results from last 7 days   Lab Units 08/09/20 2230 08/09/20 2126   PROCALCITONIN ng/mL  --  0.09   LACTATE mmol/L 1.2  --        Coag     HbA1C No results found for: HGBA1C  Infection Results from last 7 days   Lab Units 08/09/20 2230 08/09/20 2126   BLOODCX  No growth at 24 hours  No growth at 24 hours  --    PROCALCITONIN ng/mL  --  0.09     Radiology(recent) Xr Chest 1 View    Result Date: 8/9/2020  Bibasilar consolidation may represent pneumonia. There are advanced background emphysematous changes, and short-term follow-up to document resolution is recommended.  This report was finalized on 8/9/2020 10:07 PM by Dr. Rocio Abdullahi M.D.      Troponin T   Date Value Ref Range Status   08/09/2020 <0.010 0.000 - 0.030 ng/mL Final     No components found for: TSH;2      brimonidine 1 drop Left Eye BID   budesonide-formoterol 2 puff Inhalation BID - RT   cefTRIAXone 1 g Intravenous Q24H   dilTIAZem  mg Oral Q24H   docusate sodium 200 mg Oral Daily   enoxaparin 40 mg Subcutaneous Q24H   losartan 50 mg Oral Daily   prednisoLONE acetate 1 drop Left Eye TID   predniSONE 20 mg Oral BID With Meals   sodium chloride 10 mL Intravenous Q12H       lactated ringers 50 mL/hr Last Rate: 50 mL/hr (08/10/20 5235)   Diet Regular      Assessment/Plan      Active Hospital Problems    Diagnosis  POA   • **Pneumonia [J18.9]  Yes   •  COPD (chronic obstructive pulmonary disease) (CMS/[facility]) [J44.9]  Yes   • HTN (hypertension) [I10]  Yes   • Acute sinusitis [J01.90]  Yes   • Acute respiratory failure with hypoxia (CMS/[facility]) [J96.01]  Yes      Resolved Hospital Problems   No resolved problems to display.     70-year-old with history of COPD who presents with fever and dyspnea.  He had been on antibiotics for a couple days prior to admission for suspected sinus infection but due to persistent symptoms he came to the ER.    · Continue IV ABX.  His white blood cell count and procalcitonin were negative on admission but he had been on antibiotics for a few days prior to admission  · On bronchodilators and prednisone.  Patient states long time ago he had seen pulmonologist but has been sometime and wishes to get established with [facility] while here. Rhonchi heard yesterday but lungs clear today but still requiring oxygen.    · Initial COVID was negative. Low chance this was false negative will get repeat COVID testing and if still negative then can likely take out of isolation.   · Above medications    Reviewed previous records      Alejandro Parra MD  [facility]  08/11/20  2:02 PM

## 2020-08-11 NOTE — PLAN OF CARE
Problem: Patient Care Overview  Goal: Plan of Care Review  Outcome: Ongoing (interventions implemented as appropriate)  Flowsheets  Taken 8/11/2020 1720  Progress: improving  Outcome Summary: patient AOx4 VSS, he is COVID neg taken out of isolation. Pulmonary consulted on 2L NC. will CTM.  Taken 8/11/2020 0813  Plan of Care Reviewed With: patient     Problem: Pneumonia (Adult)  Goal: Signs and Symptoms of Listed Potential Problems Will be Absent, Minimized or Managed (Pneumonia)  Outcome: Ongoing (interventions implemented as appropriate)  Flowsheets (Taken 8/11/2020 1720)  Problems Assessed (Pneumonia): respiratory compromise  Problems Present (Pneumonia): respiratory compromise  Note:   Patient has COPD

## 2020-08-11 NOTE — CONSULTS
Referring Provider: Dr. Parra  Reason for Consultation: [facility]    Patient Care Team:  Khris Powers MD as PCP - General (Internal Medicine)    Chief complaint:   Dyspnea    History of present illness:    Subjective   This is a 70-year-old male patient, former smoker (20 packs year, stopped in 1998) with history of secondhand exposure to smoking.  He has a diagnosis of COPD and uses Dulera, Incruse and rescue albuterol inhaler/nebulizer at baseline.  He stated that usually does not require his rescue inhaler more than once a month or so but recently has been sick and requiring it more often.    He started to feel worse Monday on 8/3/2020 with more difficulty breathing on activities, partially alleviated by the use of rescue inhaler/nebulizer.  On Wednesday, 8/5/2020, he started experiencing some sinus congestion and sinus pressure associated with low-grade temperature around 100.4 which gradually got worse and reached 101 last Sunday.  He did not have any cough or chills and he denied any sick contact.  This prompted visit to the ED.    Had a temperature of 100.9 on arrival and cindy SPO2 was 89% on 1 L oxygen.  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  He used to follow with Dr. Levy, pulmonology at [facility] but has not seen a provider for a while.  Symptoms were alleviated by the use of Flonase and over-the-counter allergy medication.  He did start antibiotics with amoxicillin on Thursday, 8/6/2020.    Labs reviewed:  WBC 6; neutrophils 75%; bicarb 27; ferritin 665; albumin 2.9; procalcitonin normal; nasopharyngeal PCR including SARS-COVID 2 negative x2.    CT abdomen report 6/7/2018 at [place] indicated RML nodule 6 mm and mild interstitial scarring versus atelectasis, right greater than sign left.    Latest CXR at [place] on 8/6/2020 and  2019 reported bilateral basilar infiltrates, nonspecific.    Review of Systems  Constitutional: Fever as above with no chills  ENMT: Sinus congestion.  Cardiovascular: No chest pain, palpitation or legs swelling.    Respiratory: See above  Gastrointestinal: No constipation, diarrhea or abdominal pain   Neurology: No headache, weakness, numbness or dizziness.   Musculoskeletal: No joints pain, stiffness or swelling.   Psychiatry: No depression.  Genitourinary: No dysuria or frequent urination  Endo: No weight changes. No cold or warm intolerance.  Lymphatic: No swollen glands.  Integumentary: No rash.    History  Past Medical History:   Diagnosis Date   • Arthritis    • COPD (chronic obstructive pulmonary disease) (CMS/[facility])    • Hypertension    ,   Past Surgical History:   Procedure Laterality Date   • COLONOSCOPY     , History reviewed. No pertinent family history.,   Social History     Tobacco Use   • Smoking status: Former Smoker     Last attempt to quit: 8/10/1998     Years since quittin.0   • Smokeless tobacco: Never Used   Substance Use Topics   • Alcohol use: Yes   • Drug use: Never   ,   Medications Prior to Admission   Medication Sig Dispense Refill Last Dose   • albuterol (PROVENTIL) (2.5 MG/3ML) 0.083% nebulizer solution Inhale 2.5 mg.   Past Week at Unknown time   • albuterol sulfate  (90 Base) MCG/ACT inhaler Inhale 2 puffs.   2020 at Unknown time   • amoxicillin (AMOXIL) 500 MG tablet Take 500 mg by mouth.   2020 at Unknown time   • dilTIAZem (TIAZAC) 360 MG 24 hr capsule Take 360 mg by mouth Daily.   2020 at Unknown time   • docusate sodium 100 MG capsule Take 200 mg by mouth Daily.   2020 at Unknown time   • losartan (COZAAR) 50 MG tablet Take 50 mg by mouth Daily.   2020 at Unknown time   • mometasone-formoterol (DULERA 100) 100-5 MCG/ACT inhaler Inhale 2 puffs Daily.   2020 at Unknown time   • sodium chloride 0.65 % nasal spray 1-2 sprays into the nostril(s)  as directed by provider.   8/9/2020 at Unknown time   • Umeclidinium Bromide (INCRUSE ELLIPTA) 62.5 MCG/INH aerosol powder  Inhale 1 puff Daily.   8/9/2020 at Unknown time   • brimonidine (ALPHAGAN) 0.15 % ophthalmic solution Inject 1 drop into the eye.      • prednisoLONE acetate (PRED FORTE) 1 % ophthalmic suspension Apply 1 drop to eye(s) as directed by provider 3 (Three) Times a Day.   Patient Taking Differently   , Scheduled Meds:    brimonidine 1 drop Left Eye BID   budesonide-formoterol 2 puff Inhalation BID - RT   cefTRIAXone 1 g Intravenous Q24H   dilTIAZem  mg Oral Q24H   docusate sodium 200 mg Oral Daily   enoxaparin 40 mg Subcutaneous Q24H   losartan 50 mg Oral Daily   prednisoLONE acetate 1 drop Left Eye TID   [START ON 8/12/2020] predniSONE 20 mg Oral Daily With Breakfast   sodium chloride 10 mL Intravenous Q12H    and Allergies:  Patient has no known allergies.    Objective     Vital Signs   Temp:  [97 °F (36.1 °C)-97.7 °F (36.5 °C)] 97.4 °F (36.3 °C)  Heart Rate:  [46-73] 72  Resp:  [14-16] 16  BP: (144-161)/(90-98) 161/98    Physical Exam:  Constitutional: Not in acute distress.  Eyes: Injected conjunctivae, EOMI. pupils equal reactive to light.  ENMT: John 2. No oral thrush. Tonsils grade 1.  Neck:  Trachea midline. No thyromegaly  Heart: RRR, no murmur  Lungs: Significantly diminished breath sounds bilaterally especially at the bases.  Right lower lobe crackles.  No wheezing.  Nonlabored breathing  Abdomen:  Soft. No tenderness or dullness. No HSM.  Extremities: No cyanosis, clubbing or pitting edema.  Warm extremities and well-perfused.  Neuro: Conscious, alert, oriented x3.  Strength 5/5 in arms.  Psych: Appropriate mood and affect.    Integumentary: No rash.  Normal skin turgor  Lymphatic: No palpable cervical or supraclavicular lymph nodes.      Diagnostic imaging:  I personally and independently reviewed the following images:   CXR 8/9/2020: Right lower lobe consolidation.  Small  left lower lobe opacity which may represent scarring.  Background of emphysema.  Possible right pleural effusion.          Assessment   1. Right lower lobe pneumonia  2. COPD/emphysema, no exacerbation  3. Sinus congestion/acute sinusitis  4. Chronic bilateral pulmonary interstitial infiltrates: Unable to differentiate how much of the infiltrates noted on the x-ray are related to scarring versus pneumonia.  Prior CXR reports from [place] indicated chronic interstitial infiltrates  5. Acute hypoxia, secondary #1 on top of COPD      Recommendations:  Agree with Rocephin for treatment of community-acquired pneumonia.  Add azithromycin for atypical coverage although his nasopharyngeal PCR is negative for atypical agent but false negative test can occur as.     Check CT chest to evaluate for pleural effusion on the right.  If effusion is present then this may warrant an extended antibiotic therapy such as 10-14 days instead of usual 7 days treatment for pneumonia.  He does not have pleuritic chest pain to suggest infected pleural fluid.  In addition the CT chest with help to evaluate underlying fibrosis which has been reported on his prior CXR from outside facility.    Oxygen by nasal cannula.  Check walking oximetry.  He will probably need oxygen on discharge at least on activities.    Agree with Symbicort and PRN albuterol for COPD    Krzysztof Euceda MD  08/11/20  16:01

## 2020-08-11 NOTE — PLAN OF CARE
Vital signs stable. Sinus maurice overnight. Weaned to 1L NC. Denies shortness of breath. IV antibiotics given. Await sputum sample. Will continue to monitor.

## 2020-08-12 ENCOUNTER — APPOINTMENT (OUTPATIENT)
Dept: CT IMAGING | Facility: HOSPITAL | Age: 70
End: 2020-08-12

## 2020-08-12 VITALS
BODY MASS INDEX: 23.99 KG/M2 | WEIGHT: 158.29 LBS | RESPIRATION RATE: 18 BRPM | TEMPERATURE: 97.6 F | HEIGHT: 68 IN | SYSTOLIC BLOOD PRESSURE: 154 MMHG | DIASTOLIC BLOOD PRESSURE: 91 MMHG | HEART RATE: 59 BPM | OXYGEN SATURATION: 93 %

## 2020-08-12 PROCEDURE — 94618 PULMONARY STRESS TESTING: CPT

## 2020-08-12 PROCEDURE — 94799 UNLISTED PULMONARY SVC/PX: CPT

## 2020-08-12 PROCEDURE — 63710000001 PREDNISONE PER 1 MG: Performed by: INTERNAL MEDICINE

## 2020-08-12 PROCEDURE — 71250 CT THORAX DX C-: CPT

## 2020-08-12 PROCEDURE — 25010000002 ENOXAPARIN PER 10 MG: Performed by: HOSPITALIST

## 2020-08-12 RX ORDER — LEVOFLOXACIN 750 MG/1
750 TABLET ORAL DAILY
Qty: 5 TABLET | Refills: 0 | Status: SHIPPED | OUTPATIENT
Start: 2020-08-13 | End: 2020-08-18

## 2020-08-12 RX ORDER — PREDNISONE 20 MG/1
20 TABLET ORAL
Status: CANCELLED | OUTPATIENT
Start: 2020-08-13

## 2020-08-12 RX ORDER — PREDNISONE 20 MG/1
20 TABLET ORAL
Qty: 3 TABLET | Refills: 0 | Status: SHIPPED | OUTPATIENT
Start: 2020-08-13 | End: 2020-08-16

## 2020-08-12 RX ORDER — AZITHROMYCIN 250 MG/1
TABLET, FILM COATED ORAL
Qty: 4 TABLET | Refills: 0 | Status: CANCELLED | OUTPATIENT
Start: 2020-08-13

## 2020-08-12 RX ORDER — CEFDINIR 300 MG/1
300 CAPSULE ORAL 2 TIMES DAILY
Qty: 4 CAPSULE | Refills: 0 | Status: CANCELLED | OUTPATIENT
Start: 2020-08-12 | End: 2020-08-14

## 2020-08-12 RX ADMIN — DOCUSATE SODIUM 200 MG: 100 CAPSULE, LIQUID FILLED ORAL at 08:26

## 2020-08-12 RX ADMIN — DILTIAZEM HYDROCHLORIDE 360 MG: 180 CAPSULE, COATED, EXTENDED RELEASE ORAL at 08:26

## 2020-08-12 RX ADMIN — BUDESONIDE AND FORMOTEROL FUMARATE DIHYDRATE 2 PUFF: 160; 4.5 AEROSOL RESPIRATORY (INHALATION) at 08:18

## 2020-08-12 RX ADMIN — PREDNISOLONE ACETATE 1 DROP: 10 SUSPENSION/ DROPS OPHTHALMIC at 08:32

## 2020-08-12 RX ADMIN — PREDNISONE 20 MG: 20 TABLET ORAL at 08:26

## 2020-08-12 RX ADMIN — PREDNISOLONE ACETATE 1 DROP: 10 SUSPENSION/ DROPS OPHTHALMIC at 15:12

## 2020-08-12 RX ADMIN — AZITHROMYCIN MONOHYDRATE 250 MG: 250 TABLET ORAL at 08:26

## 2020-08-12 RX ADMIN — BRIMONIDINE TARTRATE 1 DROP: 1.5 SOLUTION OPHTHALMIC at 08:33

## 2020-08-12 RX ADMIN — ENOXAPARIN SODIUM 40 MG: 40 INJECTION SUBCUTANEOUS at 08:26

## 2020-08-12 RX ADMIN — LOSARTAN POTASSIUM 50 MG: 50 TABLET, FILM COATED ORAL at 08:26

## 2020-08-12 RX ADMIN — SODIUM CHLORIDE, PRESERVATIVE FREE 10 ML: 5 INJECTION INTRAVENOUS at 08:26

## 2020-08-12 NOTE — PLAN OF CARE
Problem: Patient Care Overview  Goal: Plan of Care Review  Outcome: Ongoing (interventions implemented as appropriate)  Flowsheets (Taken 8/12/2020 7781)  Outcome Summary: pt A/O x 4. SB on monitor ( in low 40s, asymptomatic). remains on 2L NC. vital signs otherwise stable. IVF infusing. up ad mamta. still needs sputum sample, pt is not coughing up as much. pt needs a walking oxemetry test today to determine O2 needs at home. plan is to go home with HH and oxygen. will CTM.

## 2020-08-12 NOTE — PROGRESS NOTES
Continued Stay Note  [facility]     Patient Name: Khris Shepard  MRN: [id number]  Today's Date: 8/12/2020    Admit Date: 8/9/2020    Discharge Plan     Row Name 08/12/20 1322       Plan    Plan  Home with wife- follow for home o2 would use [facility]    Patient/Family in Agreement with Plan  yes    Plan Comments  Spoke with pt at bedside, introduced self and explained CCP role. Discussed dc planning. Pt states plan is home with his wife, he denies HH need. CCP encouraged him to ambulate with nursing and situp in chair few times during the day to maintain mobility. Pt still requiring oxygen and following if needed at home. He is agreeable to use [facility] for DME. CCP called Ashlyn/Sae for referral. susan morelos/ccp        Discharge Codes    No documentation.       Expected Discharge Date and Time     Expected Discharge Date Expected Discharge Time    Aug 13, 2020             Karissa Cannon RN

## 2020-08-12 NOTE — PROGRESS NOTES
"                                              LOS: 3 days   Patient Care Team:  Khris Powers MD as PCP - General (Internal Medicine)    Chief Complaint:  F/up pneumonia, COPD, result of CT chest    Subjective   Interval History  Feels like his breathing is improved but not quite back to normal.  He continues to require oxygen 2 L/min at rest as of this morning.    He has mild cough with no significant phlegm.  No fever.  No side effects from the antibiotics.        REVIEW OF SYSTEMS:   CARDIOVASCULAR: No chest pain, chest pressure or chest discomfort. No palpitations or edema.   RESPIRATORY: See above  GASTROINTESTINAL: No anorexia, nausea, vomiting or diarrhea. No abdominal pain or blood.   HEMATOLOGIC: No bleeding or bruising.     Ventilator/Non-Invasive Ventilation Settings (From admission, onward)    None                Physical Exam:     Vital Signs  Temp:  [97.4 °F (36.3 °C)-98.2 °F (36.8 °C)] 97.6 °F (36.4 °C)  Heart Rate:  [31-64] 59  Resp:  [18-20] 18  BP: (138-161)/(86-94) 154/91    Intake/Output Summary (Last 24 hours) at 8/12/2020 1532  Last data filed at 8/12/2020 0828  Gross per 24 hour   Intake 1540 ml   Output 1100 ml   Net 440 ml     Flowsheet Rows      First Filed Value   Admission Height  172.7 cm (68\") Documented at 08/09/2020 2007   Admission Weight  72.6 kg (160 lb) Documented at 08/09/2020 2028          General Appearance:   Alert, cooperative, in no acute distress   ENMT:  Mallampati score 2, moist mucous membrane   Eyes:  Pupils equal and reactive to light. EOMI   Neck:  Trachea midline. No thyromegaly.   Lungs:    Right lower lobe crackles with diminished air entry overall but no wheezing.  Nonlabored breathing    Heart:   Regular rhythm and normal rate, normal S1 and S2, no         murmur   Skin:   No abnormalities observed   Abdomen:    Soft. No tenderness. No HSM.   Neuro:  Conscious, alert, oriented x3. Strength 5/5 in upper and lower  ext   Extremities:  No cyanosis, clubbing or " edema.  Warm extremities and well-perfused          Results Review:        Results from last 7 days   Lab Units 08/11/20  0613 08/10/20  0435 08/09/20  2126   SODIUM mmol/L 141 134* 134*   POTASSIUM mmol/L 4.5 3.8 3.8   CHLORIDE mmol/L 106 100 98   CO2 mmol/L 27.3 25.8 23.6   BUN mg/dL 9 8 8   CREATININE mg/dL 0.76 0.91 0.95   GLUCOSE mg/dL 117* 138* 104*   CALCIUM mg/dL 9.3 8.9 9.3     Results from last 7 days   Lab Units 08/09/20 2126   TROPONIN T ng/mL <0.010     Results from last 7 days   Lab Units 08/11/20  0613 08/10/20  0435 08/09/20  2126   WBC 10*3/mm3 6.03 6.46 7.27   HEMOGLOBIN g/dL 13.0 13.3 13.6   HEMATOCRIT % 40.4 39.9 42.5   PLATELETS 10*3/mm3 272 244 251         Results from last 7 days   Lab Units 08/09/20 2126   PROBNP pg/mL 538.0       I reviewed the patient's new clinical results.        Medication Review:     azithromycin 250 mg Oral Q24H   brimonidine 1 drop Left Eye BID   budesonide-formoterol 2 puff Inhalation BID - RT   cefTRIAXone 1 g Intravenous Q24H   dilTIAZem  mg Oral Q24H   docusate sodium 200 mg Oral Daily   enoxaparin 40 mg Subcutaneous Q24H   losartan 50 mg Oral Daily   prednisoLONE acetate 1 drop Left Eye TID   predniSONE 20 mg Oral Daily With Breakfast   sodium chloride 10 mL Intravenous Q12H         lactated ringers 50 mL/hr Last Rate: 50 mL/hr (08/11/20 1808)       Diagnostic imaging:  I personally and independently reviewed the following images:  CT chest 8/12/2020:  Right lower lobe consolidation.  Extensive emphysema, predominantly in the upper lobes.  Trace right pleural effusion which does not look loculated.    Assessment   1. Right lower lobe pneumonia  2. Trace right pleural effusion  3. COPD/emphysema, no exacerbation  4. Sinus congestion/acute sinusitis  5. Chronic bilateral pulmonary interstitial infiltrates: Unable to differentiate how much of the infiltrates noted on the x-ray are related to scarring versus pneumonia.  Prior CXR reports from [place] indicated  chronic interstitial infiltrates  6. Exercise induced hypoxemia, secondary to #1 on top of COPD    Plan   · Okay to discharge home.  We will taper off steroids quickly since it does not seem that he has significant COPD exacerbation this morning.  · Levaquin 750 mg for 5 more days (in addition to the amoxicillin he received before this will be 10- 14 days of antibiotic therapy which should cover for pneumonia and possible parapneumonic effusion).  · Continue Dulera and Incruse as outpatient  · Follow-up with outpatient pulmonary at the [facility].  If unable to do so then will be happy to see him in our office  · Oxygen 2 L/min on exertion will be prescribed    Discussed with his nurse      Krzysztof Euceda MD  08/12/20  15:32            This note was dictated utilizing Dragon dictation

## 2020-08-12 NOTE — DISCHARGE PLACEMENT REQUEST
"Sima Shepard (70 y.o. Male)     Date of Birth Social Security Number Address Home Phone MRN    1950  [address] [place] [address] [phone] [id number]    Caoda[employer] Marital Status          None        Admission Date Admission Type Admitting Provider Attending Provider Department, Room/Bed    8/9/20 Emergency Alejandro Parra MD Benton, John B, MD [address], [address]    Discharge Date Discharge Disposition Discharge Destination                       Attending Provider:  Sima Carnes MD    Allergies:  No Known Allergies    Isolation:  None   Infection:  None   Code Status:  CPR    Ht:  172.7 cm (68\")   Wt:  71.8 kg (158 lb 4.6 oz)    Admission Cmt:  None   Principal Problem:  Pneumonia [J18.9]                 Active Insurance as of 8/9/2020     Primary Coverage     Payor Plan Insurance Group Employer/Plan Group    ANTHEM BLUE CROSS ANTHEM BLUE CROSS BLUE SHIELD PPO [id number]     Payor Plan Address Payor Plan Phone Number Payor Plan Fax Number Effective Dates    [address] [phone]  1/1/2015 - None Entered    [facility] [id number]       Subscriber Name Subscriber Birth Date Member ID       SIMA SHEPARD 1950 [id number]           Secondary Coverage     Payor Plan Insurance Group Employer/Plan Group    MEDICARE MEDICARE A ONLY      Payor Plan Address Payor Plan Phone Number Payor Plan Fax Number Effective Dates    [address] [phone]  2/1/2015 - None Entered    [facility] [id number]       Subscriber Name Subscriber Birth Date Member ID       SIMA SHEPARD 1950 [id number]                 Emergency Contacts      (Rel.) Home Phone Work Phone Mobile Phone    Yanni Shepard (Spouse) -- -- [phone]              "

## 2020-08-12 NOTE — DISCHARGE SUMMARY
Patient Name: Khris Shepard  : 1950  MRN: [id number]    Date of Admission: 2020  Date of Discharge:  2020  Primary Care Physician: Khris Powers MD      Chief Complaint:   Shortness of Breath      Discharge Diagnoses     Active Hospital Problems    Diagnosis  POA   • **Pneumonia [J18.9]  Yes   • COPD (chronic obstructive pulmonary disease) (CMS/HCC) [J44.9]  Yes   • HTN (hypertension) [I10]  Yes   • Acute sinusitis [J01.90]  Yes   • Acute respiratory failure with hypoxia (CMS/HCC) [J96.01]  Yes      Resolved Hospital Problems   No resolved problems to display.        Hospital Course     Mr. Shepard is a 70 y.o. male with a history of COPD, HTN  who presented to [facility] initially complaining of shortness of breath, worse with exertion. He had been evaluated at [facility] a few days prior and started on po amoxicillin for suspected sinus infection without improvement.  Please see the admitting history and physical for further details.  He was found to have pneumonia and was admitted to the hospital for further evaluation and treatment.  He was found to be hypoxic and has qualified for home O2 by walking oximetry. Pulmonology has been following. Covid/RVP was negative but due to chance of false negative, he remained on azithromycin for atypical coverage and ceftriaxone. Repeat Covid/RVP remains negative.  Prednisone taper was also added. At discharge, he will be prescribed 5 days of po Levaquin per pulmonology.  CT Chest was performed with recommendation to repeat in 8 weeks to re-evaluate nodular pulmonary opacification. He can follow up with [facility] as he has seen in past or Dr. Euceda. He has had intermittent episodes of bradycardia but has remained asymptomatic. Prior records indicate workup in 2017 with EKG and Holter monitor for bradycardia. No further workup indicated this hospitalization. Medically, he is stable for discharge home today.          Day of Discharge     Feels well. Less dsypnea. Denies fever, cough, chest pain, palpitations, lightheadedness/dizziness. Agrees with discharge home today.    Physical Exam:  Temp:  [97.4 °F (36.3 °C)-98.2 °F (36.8 °C)] 97.6 °F (36.4 °C)  Heart Rate:  [31-64] 59  Resp:  [18-20] 18  BP: (138-161)/(86-94) 154/91  Body mass index is 24.07 kg/m².  Physical Exam   Constitutional: He is oriented to person, place, and time. He appears well-developed. No distress.   HENT:   Head: Normocephalic.   Eyes: Conjunctivae are normal.   Neck: Neck supple. No JVD present.   Cardiovascular: Normal rate and regular rhythm.   Pulmonary/Chest: Effort normal. No respiratory distress.   Decreased breath sounds throughout; no wheezing/rales   Abdominal: Soft. Bowel sounds are normal.   Musculoskeletal: He exhibits no edema.   Neurological: He is alert and oriented to person, place, and time.   Skin: Skin is warm and dry.   Psychiatric: He has a normal mood and affect.   Vitals reviewed.      Consultants     Consult Orders (all) (From admission, onward)     Start     Ordered    08/11/20 1407  Inpatient Pulmonology Consult  Once     Specialty:  Pulmonary Disease  Provider:  Marycarmen Fonseca MD    08/11/20 1407    08/09/20 2319  [facility] (on-call MD unless specified) Details  Once     Specialty:  Hospitalist  Provider:  (Not yet assigned)    08/09/20 2318              Procedures     Imaging Results (All)     Procedure Component Value Units Date/Time    CT Chest Without Contrast [[id number]] Collected:  08/12/20 1145     Updated:  08/12/20 1145    Narrative:       CT CHEST WITH IV CONTRAST     HISTORY: Right lower lobe scarring versus pneumonia.     TECHNIQUE: Radiation dose reduction techniques were utilized, including  automated exposure control and exposure modulation based on body size.   3 mm images were obtained through the chest after the administration of  IV contrast.      COMPARISON: Chest x-ray 08/09/2020.     FINDINGS:     There is no  mediastinal or axillary adenopathy by size criteria.     The ascending aorta is dilated, measuring approximately 4.3 cm in  diameter. Moderate coronary artery calcification is present. There is no  significant pericardial effusion.     There is severe emphysema. Groundglass opacification with intervening  interlobular septal thickening and somewhat nodular pulmonary  opacification is present within the right lower lobe and to a lesser  extent the inferolateral aspect of the right middle lobe. A few nodular  areas of pulmonary opacification measuring up to approximately 1 cm.  Trace right pleural effusion is present. There is no pneumothorax.     Sub-6 mm pulmonary nodule is present within the left lower lobe. There  is nonspecific dilation of right basilar pulmonary arteries versus those  on the left of indeterminate chronicity.     There is no suspicious lytic or blastic osseous lesion.       Impression:       1.  Findings most suggestive of right basilar pneumonia with  parapneumonic effusion in the appropriate clinical context. Asymmetric  pulmonary edema is felt to be less likely given its relative focal  nature. Correlation with patient history is recommended. Given the  somewhat nodular pulmonary opacification, recommend follow-up chest CT  in 8 weeks to ensure appropriate evolution and exclude background  neoplasm although this is considered less likely.  2.  Asymmetric dilation of the right basilar pulmonary arteries versus  those on the left of indeterminate chronicity but can be seen in setting  of pulmonary hypertension.  3.  Dilation of the ascending aorta measuring up to 4.3 cm.  4.  Other findings as above.          XR Chest 1 View [[id number]] Collected:  08/09/20 2206     Updated:  08/09/20 2210    Narrative:       PORTABLE CHEST RADIOGRAPH     HISTORY: Shortness of air and fever     COMPARISON: None available.     FINDINGS:  Right size is within normal limits. There is right basilar  consolidation,  which are likely may reflect pneumonia, with some  additional milder consolidation noted at the left lung base. There are  extensive background emphysematous changes. No pneumothorax is seen.  Blunting of the right costophrenic angle may reflect trace effusion.       Impression:       Bibasilar consolidation may represent pneumonia. There are advanced  background emphysematous changes, and short-term follow-up to document  resolution is recommended.     This report was finalized on 8/9/2020 10:07 PM by Dr. Rocio Abdullahi M.D.             Pertinent Labs     Results from last 7 days   Lab Units 08/11/20  0613 08/10/20  0435 08/09/20  2126   WBC 10*3/mm3 6.03 6.46 7.27   HEMOGLOBIN g/dL 13.0 13.3 13.6   PLATELETS 10*3/mm3 272 244 251     Results from last 7 days   Lab Units 08/11/20  0613 08/10/20  0435 08/09/20  2126   SODIUM mmol/L 141 134* 134*   POTASSIUM mmol/L 4.5 3.8 3.8   CHLORIDE mmol/L 106 100 98   CO2 mmol/L 27.3 25.8 23.6   BUN mg/dL 9 8 8   CREATININE mg/dL 0.76 0.91 0.95   GLUCOSE mg/dL 117* 138* 104*   Estimated Creatinine Clearance: 87.3 mL/min (by C-G formula based on SCr of 0.76 mg/dL).  Results from last 7 days   Lab Units 08/11/20 0613 08/09/20 2126   ALBUMIN g/dL 2.90* 3.50   BILIRUBIN mg/dL <0.2 0.3   ALK PHOS U/L 53 60   AST (SGOT) U/L 28 27   ALT (SGPT) U/L 31 27     Results from last 7 days   Lab Units 08/11/20  0613 08/10/20  0435 08/09/20  2126   CALCIUM mg/dL 9.3 8.9 9.3   ALBUMIN g/dL 2.90*  --  3.50   MAGNESIUM mg/dL  --  2.1  --    PHOSPHORUS mg/dL  --  2.5  --        Results from last 7 days   Lab Units 08/09/20  2126   TROPONIN T ng/mL <0.010   PROBNP pg/mL 538.0           Invalid input(s): LDLCALC  Results from last 7 days   Lab Units 08/09/20  2230   BLOODCX  No growth at 2 days  No growth at 2 days       Test Results Pending at Discharge      Order Current Status    Blood Culture - Blood, Arm, Right Preliminary result    Blood Culture - Blood, Arm, Right Preliminary result           Discharge Details        Discharge Medications      New Medications      Instructions Start Date   levoFLOXacin 750 MG tablet  Commonly known as:  Levaquin   750 mg, Oral, Daily   Start Date:  August 13, 2020     predniSONE 20 MG tablet  Commonly known as:  DELTASONE   20 mg, Oral, Daily With Breakfast   Start Date:  August 13, 2020        Changes to Medications      Instructions Start Date   albuterol sulfate  (90 Base) MCG/ACT inhaler  Commonly known as:  PROVENTIL HFA;VENTOLIN HFA;PROAIR HFA  What changed:  Another medication with the same name was removed. Continue taking this medication, and follow the directions you see here.   2 puffs, Inhalation         Continue These Medications      Instructions Start Date   brimonidine 0.15 % ophthalmic solution  Commonly known as:  ALPHAGAN   1 drop, Intraocular      dilTIAZem 360 MG 24 hr capsule  Commonly known as:  TIAZAC   360 mg, Oral, Daily      docusate sodium 100 MG capsule   200 mg, Oral, Daily      losartan 50 MG tablet  Commonly known as:  COZAAR   50 mg, Oral, Daily      mometasone-formoterol 100-5 MCG/ACT inhaler  Commonly known as:  DULERA 100   2 puffs, Inhalation, Daily      prednisoLONE acetate 1 % ophthalmic suspension  Commonly known as:  PRED FORTE   1 drop, Ophthalmic, 3 Times Daily      sodium chloride 0.65 % nasal spray   1-2 sprays, Nasal      Umeclidinium Bromide 62.5 MCG/INH aerosol powder   Commonly known as:  INCRUSE ELLIPTA   1 puff, Inhalation, Daily         Stop These Medications    amoxicillin 500 MG tablet  Commonly known as:  AMOXIL            No Known Allergies      Discharge Disposition:  Home or Self Care    Discharge Diet:  Diet Order   Procedures   • Diet Regular       Discharge Activity:   Activity Instructions     Activity as Tolerated            CODE STATUS:    Code Status and Medical Interventions:   Ordered at: 08/09/20 2305     Code Status:    CPR     Medical Interventions (Level of Support Prior to Arrest):     Full       No future appointments.  Follow-up Information     Khris Powers MD .    Specialty:  Internal Medicine  Contact information:  [address] [facility] [address] [phone]                   Time Spent on Discharge:  Greater than 30 minutes      ANGE Conway  [facility]  08/12/20  3:23 PM

## 2020-08-12 NOTE — NURSING NOTE
Patients HR dropped to low 40s.  Patient asymptomatic.  Page out to Dr. Carnes to notify.  Awaiting return call.

## 2020-08-12 NOTE — PROGRESS NOTES
Exercise Oximetry    Patient Name:Khris Shepard   MRN: [id number]   Date: 08/12/20             ROOM AIR BASELINE   SpO2% 92   Heart Rate 61   Blood Pressure 155/89     EXERCISE ON ROOM AIR SpO2% EXERCISE ON O2 @ 2 LPM SpO2%   1 MINUTE 85 1 MINUTE 89   2 MINUTES  2 MINUTES 91   3 MINUTES  3 MINUTES 92   4 MINUTES  4 MINUTES    5 MINUTES  5 MINUTES    6 MINUTES  6 MINUTES               Distance Walked  50 yards Distance Walked   Dyspnea (Robin Scale)   Dyspnea (Robin Scale)   Fatigue (Robin Scale)   Fatigue (Robin Scale)   SpO2% Post Exercise  96 SpO2% Post Exercise   HR Post Exercise 57 HR Post Exercise   Time to Recovery  4 mn Time to Recovery     Comments: patent did not need assistance.  Pts sats dropped to 85% from bed to door.  Pts RR 26.  Placed on 2lpm; pt returned to sitting up on bed; recovered in 4 min to 96% on 2l nc

## 2020-08-13 ENCOUNTER — READMISSION MANAGEMENT (OUTPATIENT)
Dept: CALL CENTER | Facility: HOSPITAL | Age: 70
End: 2020-08-13

## 2020-08-13 NOTE — PROGRESS NOTES
Case Management Discharge Note      Final Note: Home with wife and [facility] for home o2. [email or web address] rn/ccp         Destination      No service has been selected for the patient.      Durable Medical Equipment      No service has been selected for the patient.      Dialysis/Infusion      No service has been selected for the patient.      Home Medical Care      No service has been selected for the patient.      Therapy      No service has been selected for the patient.      Community Resources      No service has been selected for the patient.        Transportation Services  Private: Car    Final Discharge Disposition Code: 01 - home or self-care

## 2020-08-13 NOTE — OUTREACH NOTE
Prep Survey      Responses   Gnosticism facility patient discharged from?  [facility]   Is LACE score < 7 ?  No   Eligibility  Readm Mgmt   Discharge diagnosis  Pneumonia   Does the patient have one of the following disease processes/diagnoses(primary or secondary)?  COPD/Pneumonia   Does the patient have Home health ordered?  No   Is there a DME ordered?  Yes   What DME was ordered?  Oxygen per [facility]    Prep survey completed?  Yes          Amelia Castillo RN

## 2020-08-14 LAB
BACTERIA SPEC AEROBE CULT: NORMAL
BACTERIA SPEC AEROBE CULT: NORMAL

## 2020-08-18 ENCOUNTER — READMISSION MANAGEMENT (OUTPATIENT)
Dept: CALL CENTER | Facility: HOSPITAL | Age: 70
End: 2020-08-18

## 2020-08-18 NOTE — OUTREACH NOTE
COPD/PN Week 1 Survey      Responses   [facility]   COVID-19 Test Status  Negative   Does the patient have one of the following disease processes/diagnoses(primary or secondary)?  COPD/Pneumonia   Is there a successful TCM telephone encounter documented?  No   Was the primary reason for admission:  Pneumonia   Week 1 attempt successful?  Yes   Call start time  1658   Call end time  1703   Discharge diagnosis  Pneumonia   Is patient permission given to speak with other caregiver?  Yes   List who call center can speak with  spouse, Yanni Shepard   Meds reviewed with patient/caregiver?  Yes   Is the patient having any side effects they believe may be caused by any medication additions or changes?  No   Does the patient have all medications ordered at discharge?  Yes   Is the patient taking all medications as directed (includes completed medication regime)?  Yes   Does the patient have a primary care provider?   Yes   Does the patient have an appointment with their PCP or pulmonologist within 7 days of discharge?  No   Comments regarding PCP  PCP Dr Powers.    Nursing Interventions  Educated patient on importance of making appointment, Advised patient to make appointment   Has the patient kept scheduled appointments due by today?  N/A   Has home health visited the patient within 72 hours of discharge?  N/A   What DME was ordered?  Oxygen per [facility]    Has all DME been delivered?  Yes   Pulse Ox monitoring  Intermittent   Pulse Ox device source  Patient   O2 Sat comments  Patient reports wearing O2 on PRN basis. Patient reports at rest he is in high 90's, but can drop into the 80's with activity.    O2 Sat: education provided  Monitoring frequency, When to seek care   Psychosocial issues?  No   Did the patient receive a copy of their discharge instructions?  Yes   Nursing interventions  Reviewed instructions with patient   What is the patient's perception of their health  status since discharge?  Improving   Is the patient/caregiver able to teach back the hierarchy of who to call/visit for symptoms/problems? PCP, Specialist, Home health nurse, Urgent Care, ED, 911  Yes   Is the patient/caregiver able to teach back signs and symptoms of worsening condition:  Fever/chills, Shortness of breath, Chest pain   Is the patient/caregiver able to teach back importance of completing antibiotic course of treatment?  Yes   Week 1 call completed?  Yes          Ashlyn Agustin RN